# Patient Record
Sex: FEMALE | Race: WHITE | NOT HISPANIC OR LATINO
[De-identification: names, ages, dates, MRNs, and addresses within clinical notes are randomized per-mention and may not be internally consistent; named-entity substitution may affect disease eponyms.]

---

## 2019-03-04 ENCOUNTER — APPOINTMENT (OUTPATIENT)
Dept: SURGERY | Facility: CLINIC | Age: 64
End: 2019-03-04
Payer: COMMERCIAL

## 2019-03-04 VITALS
HEART RATE: 84 BPM | DIASTOLIC BLOOD PRESSURE: 77 MMHG | WEIGHT: 233.4 LBS | RESPIRATION RATE: 18 BRPM | SYSTOLIC BLOOD PRESSURE: 138 MMHG | TEMPERATURE: 99.1 F | BODY MASS INDEX: 41.36 KG/M2 | OXYGEN SATURATION: 97 % | HEIGHT: 63 IN

## 2019-03-04 DIAGNOSIS — Z98.84 BARIATRIC SURGERY STATUS: ICD-10-CM

## 2019-03-04 DIAGNOSIS — Z82.49 FAMILY HISTORY OF ISCHEMIC HEART DISEASE AND OTHER DISEASES OF THE CIRCULATORY SYSTEM: ICD-10-CM

## 2019-03-04 DIAGNOSIS — Z80.9 FAMILY HISTORY OF MALIGNANT NEOPLASM, UNSPECIFIED: ICD-10-CM

## 2019-03-04 DIAGNOSIS — Z87.891 PERSONAL HISTORY OF NICOTINE DEPENDENCE: ICD-10-CM

## 2019-03-04 LAB — HBA1C MFR BLD HPLC: 5.7 %

## 2019-03-04 PROCEDURE — 99203 OFFICE O/P NEW LOW 30 MIN: CPT

## 2019-03-04 RX ORDER — ATORVASTATIN CALCIUM 20 MG/1
20 TABLET, FILM COATED ORAL
Refills: 0 | Status: ACTIVE | COMMUNITY

## 2019-03-05 LAB
25(OH)D3 SERPL-MCNC: 17.3 NG/ML
ALBUMIN SERPL ELPH-MCNC: 4.4 G/DL
ALP BLD-CCNC: 99 U/L
ALT SERPL-CCNC: 14 U/L
ANION GAP SERPL CALC-SCNC: 13 MMOL/L
AST SERPL-CCNC: 16 U/L
BASOPHILS # BLD AUTO: 0.1 K/UL
BASOPHILS NFR BLD AUTO: 1 %
BILIRUB SERPL-MCNC: 0.8 MG/DL
BUN SERPL-MCNC: 21 MG/DL
CALCIUM SERPL-MCNC: 10.1 MG/DL
CHLORIDE SERPL-SCNC: 105 MMOL/L
CO2 SERPL-SCNC: 24 MMOL/L
CREAT SERPL-MCNC: 0.82 MG/DL
EOSINOPHIL # BLD AUTO: 0.09 K/UL
EOSINOPHIL NFR BLD AUTO: 0.9 %
FOLATE SERPL-MCNC: 6.4 NG/ML
GLUCOSE SERPL-MCNC: 101 MG/DL
HCT VFR BLD CALC: 42.1 %
HGB BLD-MCNC: 13.2 G/DL
IMM GRANULOCYTES NFR BLD AUTO: 0.2 %
IRON SERPL-MCNC: 67 UG/DL
LYMPHOCYTES # BLD AUTO: 2.41 K/UL
LYMPHOCYTES NFR BLD AUTO: 24.1 %
MAN DIFF?: NORMAL
MCHC RBC-ENTMCNC: 28.3 PG
MCHC RBC-ENTMCNC: 31.4 GM/DL
MCV RBC AUTO: 90.3 FL
MONOCYTES # BLD AUTO: 0.72 K/UL
MONOCYTES NFR BLD AUTO: 7.2 %
NEUTROPHILS # BLD AUTO: 6.68 K/UL
NEUTROPHILS NFR BLD AUTO: 66.6 %
PLATELET # BLD AUTO: 464 K/UL
POTASSIUM SERPL-SCNC: 4.6 MMOL/L
PROT SERPL-MCNC: 7.3 G/DL
RBC # BLD: 4.66 M/UL
RBC # FLD: 14 %
SODIUM SERPL-SCNC: 142 MMOL/L
VIT B12 SERPL-MCNC: 377 PG/ML
WBC # FLD AUTO: 10.02 K/UL

## 2019-03-10 ENCOUNTER — FORM ENCOUNTER (OUTPATIENT)
Age: 64
End: 2019-03-10

## 2019-03-11 ENCOUNTER — OUTPATIENT (OUTPATIENT)
Dept: OUTPATIENT SERVICES | Facility: HOSPITAL | Age: 64
LOS: 1 days | End: 2019-03-11
Payer: MEDICAID

## 2019-03-11 ENCOUNTER — APPOINTMENT (OUTPATIENT)
Dept: RADIOLOGY | Facility: HOSPITAL | Age: 64
End: 2019-03-11
Payer: COMMERCIAL

## 2019-03-11 DIAGNOSIS — Z00.00 ENCOUNTER FOR GENERAL ADULT MEDICAL EXAMINATION WITHOUT ABNORMAL FINDINGS: ICD-10-CM

## 2019-03-11 DIAGNOSIS — Z46.51 ENCOUNTER FOR FITTING AND ADJUSTMENT OF GASTRIC LAP BAND: ICD-10-CM

## 2019-03-11 LAB — VIT B1 SERPL-MCNC: 125 NMOL/L

## 2019-03-11 PROCEDURE — 43999 UNLISTED PROCEDURE STOMACH: CPT

## 2019-03-11 PROCEDURE — 77002 NEEDLE LOCALIZATION BY XRAY: CPT

## 2019-04-30 ENCOUNTER — APPOINTMENT (OUTPATIENT)
Dept: SURGERY | Facility: CLINIC | Age: 64
End: 2019-04-30
Payer: COMMERCIAL

## 2019-04-30 VITALS
SYSTOLIC BLOOD PRESSURE: 107 MMHG | DIASTOLIC BLOOD PRESSURE: 70 MMHG | OXYGEN SATURATION: 98 % | RESPIRATION RATE: 16 BRPM | HEART RATE: 89 BPM | TEMPERATURE: 98.8 F

## 2019-04-30 PROCEDURE — 99214 OFFICE O/P EST MOD 30 MIN: CPT

## 2019-09-03 ENCOUNTER — RESULT REVIEW (OUTPATIENT)
Age: 64
End: 2019-09-03

## 2019-09-05 ENCOUNTER — APPOINTMENT (OUTPATIENT)
Dept: PLASTIC SURGERY | Facility: CLINIC | Age: 64
End: 2019-09-05
Payer: COMMERCIAL

## 2019-09-05 VITALS — BODY MASS INDEX: 40.75 KG/M2 | HEIGHT: 63 IN | WEIGHT: 230 LBS

## 2019-09-05 PROCEDURE — 99204 OFFICE O/P NEW MOD 45 MIN: CPT

## 2019-09-09 NOTE — ASSESSMENT
[FreeTextEntry1] :  63 year old female presents for initial consultation at the request of Dr. Lei regarding excess skin of abdomen and thighs. Pt is s/p lap band surgery 9 years ago. Plan to proceed with laparoscopic sleeve gastrectomy 10/2019. \par \par Exam findings discussed with the patient today. We discussed possible panniculectomy abdominoplasty as well as bilateral thigh lift in conjunction with liposuction for symptomatic relief. We discussed the planned procedure including alternatives, risks and potential complications which include but are not limited to infection, bleeding, recurrence, seroma, asymmetry, deformity, scarring, paresthesia, wound dehiscence. We also discussed post-operative recovery period as well as restrictions. We discussed what to expect in terms of scarring as well. All patient questions were answered. It was recommended to proceed with planned weight loss as well as achieving stable and goal weight prior to proceed with either surgical interventions.\par \par Follow up in 6 months.

## 2019-09-09 NOTE — ADDENDUM
[FreeTextEntry1] : All medical record entries made were at my, OSCAR JOINER MD, direction and personally dictated by me. I have reviewed the chart and agree that the record accurately reflects my personal performance of the history, physical exam, assessment, and plan.

## 2019-09-09 NOTE — CONSULT LETTER
[Dear  ___] : Dear  [unfilled], [Please see my note below.] : Please see my note below. [Consult Letter:] : I had the pleasure of evaluating your patient, [unfilled]. [Consult Closing:] : Thank you very much for allowing me to participate in the care of this patient.  If you have any questions, please do not hesitate to contact me. [Sincerely,] : Sincerely, [FreeTextEntry3] : Dennys Garcia MD

## 2019-09-09 NOTE — PHYSICAL EXAM
[de-identified] : The patient is ambulatory, well groomed, no signs of cachexia. [de-identified] : Normocephalic, atraumatic. [de-identified] : There are no masses, scars, or lesions of the external ear. External nose is midline with no scars or masses. Gross hearing intact bilaterally (finger rub). Nasal mucosa has no edema, lesions, or signs of desiccation. Lips and gums have no masses, lesions, friability, or edema. [de-identified] : No conjunctival erythema, hyperemia, or discharge bilaterally; no ectropion, entropion, lagophthalmos, or lid malposition bilaterally. Pupils are equal, round, and reactive to light bilaterally. [de-identified] : No sign of respiratory distress, no tachypnea, no use of accessory respiratory muscles. [de-identified] : Neck is soft without edema, masses, or crepitus. Trachea midline. No thyromegaly; no palpable thyroid nodules. [de-identified] : No swelling, tenderness, or varicosities of the extremities bilaterally; extremities are warm to palpation and pedal pulses intact. [de-identified] : No hepatomegaly or splenomegaly. No abdominal wall tenderness or masses on palpation. No abdominal wall hernias noted.\par heavy hanging infraumbilical pannus, no intertrigo noted today, no erythema\par  [de-identified] : On examination, patient has normal gait and station.\par  [de-identified] : The patient is alert and oriented to time, place, and person. No obvious disorder of mood or affect such as anxiety or agitation. [de-identified] : heavy hanging excess skin noted on the upper inner and outer thighs\par

## 2019-09-09 NOTE — HISTORY OF PRESENT ILLNESS
[FreeTextEntry1] : This is a pleasant 63 year old female presents for initial consultation at the request of Dr. Lei regarding excess skin of abdomen and thighs. Pt is s/p lap band surgery 9 years ago. Prior to lap band placement patient weighed 299 lbs, following surgery her weight was 180 lbs. She currently weighs 230 lbs, currently undergoing dieting and exercise. She states that she plans on proceeding with laparoscopic sleeve gastrectomy this coming October with Dr. Lei. However, she would like to discuss her options following her weight loss in terms of the excess heavy skin. Pt states that due to her drastic weight loss, she has heavy loose hanging skin. Often gets rashes in between the skin folds that she treats with creams. Here for evaluation. \par Non-smoker. No anticoagulant use. Otherwise no other complaints or concerns; denies fever, sweats, or chills.

## 2019-11-18 DIAGNOSIS — E53.8 DEFICIENCY OF OTHER SPECIFIED B GROUP VITAMINS: ICD-10-CM

## 2019-12-04 DIAGNOSIS — N20.0 CALCULUS OF KIDNEY: ICD-10-CM

## 2020-02-03 ENCOUNTER — APPOINTMENT (OUTPATIENT)
Dept: SURGERY | Facility: CLINIC | Age: 65
End: 2020-02-03
Payer: COMMERCIAL

## 2020-02-03 PROCEDURE — 99215 OFFICE O/P EST HI 40 MIN: CPT

## 2020-02-11 ENCOUNTER — OUTPATIENT (OUTPATIENT)
Dept: OUTPATIENT SERVICES | Facility: HOSPITAL | Age: 65
LOS: 1 days | End: 2020-02-11
Payer: COMMERCIAL

## 2020-02-11 VITALS
HEIGHT: 63 IN | HEART RATE: 75 BPM | DIASTOLIC BLOOD PRESSURE: 78 MMHG | SYSTOLIC BLOOD PRESSURE: 129 MMHG | RESPIRATION RATE: 20 BRPM | WEIGHT: 228.62 LBS | TEMPERATURE: 99 F

## 2020-02-11 DIAGNOSIS — Z98.890 OTHER SPECIFIED POSTPROCEDURAL STATES: Chronic | ICD-10-CM

## 2020-02-11 DIAGNOSIS — Z98.891 HISTORY OF UTERINE SCAR FROM PREVIOUS SURGERY: Chronic | ICD-10-CM

## 2020-02-11 DIAGNOSIS — Z13.89 ENCOUNTER FOR SCREENING FOR OTHER DISORDER: ICD-10-CM

## 2020-02-11 DIAGNOSIS — Z90.89 ACQUIRED ABSENCE OF OTHER ORGANS: Chronic | ICD-10-CM

## 2020-02-11 DIAGNOSIS — Z98.84 BARIATRIC SURGERY STATUS: Chronic | ICD-10-CM

## 2020-02-11 DIAGNOSIS — Z29.9 ENCOUNTER FOR PROPHYLACTIC MEASURES, UNSPECIFIED: ICD-10-CM

## 2020-02-11 DIAGNOSIS — Z01.818 ENCOUNTER FOR OTHER PREPROCEDURAL EXAMINATION: ICD-10-CM

## 2020-02-11 DIAGNOSIS — E66.01 MORBID (SEVERE) OBESITY DUE TO EXCESS CALORIES: ICD-10-CM

## 2020-02-11 DIAGNOSIS — Z90.49 ACQUIRED ABSENCE OF OTHER SPECIFIED PARTS OF DIGESTIVE TRACT: Chronic | ICD-10-CM

## 2020-02-11 DIAGNOSIS — K43.2 INCISIONAL HERNIA WITHOUT OBSTRUCTION OR GANGRENE: Chronic | ICD-10-CM

## 2020-02-11 LAB
ALBUMIN SERPL ELPH-MCNC: 4.2 G/DL — SIGNIFICANT CHANGE UP (ref 3.3–5.2)
ALP SERPL-CCNC: 106 U/L — SIGNIFICANT CHANGE UP (ref 40–120)
ALT FLD-CCNC: 24 U/L — SIGNIFICANT CHANGE UP
ANION GAP SERPL CALC-SCNC: 14 MMOL/L — SIGNIFICANT CHANGE UP (ref 5–17)
AST SERPL-CCNC: 28 U/L — SIGNIFICANT CHANGE UP
BASOPHILS # BLD AUTO: 0.09 K/UL — SIGNIFICANT CHANGE UP (ref 0–0.2)
BASOPHILS NFR BLD AUTO: 0.9 % — SIGNIFICANT CHANGE UP (ref 0–2)
BILIRUB SERPL-MCNC: 0.7 MG/DL — SIGNIFICANT CHANGE UP (ref 0.4–2)
BLD GP AB SCN SERPL QL: SIGNIFICANT CHANGE UP
BUN SERPL-MCNC: 17 MG/DL — SIGNIFICANT CHANGE UP (ref 8–20)
CALCIUM SERPL-MCNC: 9.7 MG/DL — SIGNIFICANT CHANGE UP (ref 8.6–10.2)
CHLORIDE SERPL-SCNC: 97 MMOL/L — LOW (ref 98–107)
CO2 SERPL-SCNC: 25 MMOL/L — SIGNIFICANT CHANGE UP (ref 22–29)
CREAT SERPL-MCNC: 0.81 MG/DL — SIGNIFICANT CHANGE UP (ref 0.5–1.3)
EOSINOPHIL # BLD AUTO: 0.11 K/UL — SIGNIFICANT CHANGE UP (ref 0–0.5)
EOSINOPHIL NFR BLD AUTO: 1.1 % — SIGNIFICANT CHANGE UP (ref 0–6)
GLUCOSE SERPL-MCNC: 105 MG/DL — HIGH (ref 70–99)
HBA1C BLD-MCNC: 5.5 % — SIGNIFICANT CHANGE UP (ref 4–5.6)
HCT VFR BLD CALC: 43.5 % — SIGNIFICANT CHANGE UP (ref 34.5–45)
HGB BLD-MCNC: 13.6 G/DL — SIGNIFICANT CHANGE UP (ref 11.5–15.5)
IMM GRANULOCYTES NFR BLD AUTO: 0.2 % — SIGNIFICANT CHANGE UP (ref 0–1.5)
LYMPHOCYTES # BLD AUTO: 2.37 K/UL — SIGNIFICANT CHANGE UP (ref 1–3.3)
LYMPHOCYTES # BLD AUTO: 23.5 % — SIGNIFICANT CHANGE UP (ref 13–44)
MAGNESIUM SERPL-MCNC: 2.2 MG/DL — SIGNIFICANT CHANGE UP (ref 1.6–2.6)
MCHC RBC-ENTMCNC: 28 PG — SIGNIFICANT CHANGE UP (ref 27–34)
MCHC RBC-ENTMCNC: 31.3 GM/DL — LOW (ref 32–36)
MCV RBC AUTO: 89.7 FL — SIGNIFICANT CHANGE UP (ref 80–100)
MONOCYTES # BLD AUTO: 0.66 K/UL — SIGNIFICANT CHANGE UP (ref 0–0.9)
MONOCYTES NFR BLD AUTO: 6.5 % — SIGNIFICANT CHANGE UP (ref 2–14)
NEUTROPHILS # BLD AUTO: 6.83 K/UL — SIGNIFICANT CHANGE UP (ref 1.8–7.4)
NEUTROPHILS NFR BLD AUTO: 67.8 % — SIGNIFICANT CHANGE UP (ref 43–77)
PHOSPHATE SERPL-MCNC: 3.3 MG/DL — SIGNIFICANT CHANGE UP (ref 2.4–4.7)
PLATELET # BLD AUTO: 449 K/UL — HIGH (ref 150–400)
POTASSIUM SERPL-MCNC: 4.4 MMOL/L — SIGNIFICANT CHANGE UP (ref 3.5–5.3)
POTASSIUM SERPL-SCNC: 4.4 MMOL/L — SIGNIFICANT CHANGE UP (ref 3.5–5.3)
PROT SERPL-MCNC: 7.6 G/DL — SIGNIFICANT CHANGE UP (ref 6.6–8.7)
RBC # BLD: 4.85 M/UL — SIGNIFICANT CHANGE UP (ref 3.8–5.2)
RBC # FLD: 13.3 % — SIGNIFICANT CHANGE UP (ref 10.3–14.5)
SODIUM SERPL-SCNC: 136 MMOL/L — SIGNIFICANT CHANGE UP (ref 135–145)
WBC # BLD: 10.08 K/UL — SIGNIFICANT CHANGE UP (ref 3.8–10.5)
WBC # FLD AUTO: 10.08 K/UL — SIGNIFICANT CHANGE UP (ref 3.8–10.5)

## 2020-02-11 PROCEDURE — G0463: CPT

## 2020-02-11 PROCEDURE — 93005 ELECTROCARDIOGRAM TRACING: CPT

## 2020-02-11 PROCEDURE — 93010 ELECTROCARDIOGRAM REPORT: CPT

## 2020-02-11 RX ORDER — CEFAZOLIN SODIUM 1 G
2000 VIAL (EA) INJECTION ONCE
Refills: 0 | Status: DISCONTINUED | OUTPATIENT
Start: 2020-02-18 | End: 2020-02-19

## 2020-02-11 NOTE — H&P PST ADULT - NSANTHOSAYNRD_GEN_A_CORE
No. YUSEF screening performed.  STOP BANG Legend: 0-2 = LOW Risk; 3-4 = INTERMEDIATE Risk; 5-8 = HIGH Risk

## 2020-02-11 NOTE — H&P PST ADULT - NSICDXFAMILYHX_GEN_ALL_CORE_FT
FAMILY HISTORY:  Father  Still living? No  FH: esophageal cancer, Age at diagnosis: Age Unknown    Sibling  Still living? No  FH: brain aneurysm, Age at diagnosis: 51-60  FH: colon cancer, Age at diagnosis: 41-50

## 2020-02-11 NOTE — H&P PST ADULT - NSICDXPASTSURGICALHX_GEN_ALL_CORE_FT
PAST SURGICAL HISTORY:  H/O laparoscopic adjustable gastric banding     H/O left breast biopsy     Incisional hernia     S/P      S/P cholecystectomy     S/P hernia repair x 4    S/P LASIK surgery of both eyes     S/P left knee arthroscopy     S/P tonsillectomy

## 2020-02-11 NOTE — PATIENT PROFILE ADULT - NSPROEDALEARNPREF_GEN_A_NUR
Pre op teaching surgical scrub pain management instructions given to pt by NP/individual instruction/written material/verbal instruction

## 2020-02-11 NOTE — H&P PST ADULT - HISTORY OF PRESENT ILLNESS
This is a 64 y.o female who presents to PST today. This is a 64 y.o female who presents to PST today.  The pt reports she had a gastric band inserted approximately ten years ago.  She is now having that removed and a vertical sleeve inserted.  She is scheduled for a Robotic Assisted, Laparoscopic Possible Open, Removal of Gastric Band and Conversion to Vertical Sleeve Gastrectomy in the near future.

## 2020-02-11 NOTE — H&P PST ADULT - ASSESSMENT
CAPRINI VTE 2.0 SCORE [CLOT updated 2019]    AGE RELATED RISK FACTORS                                                       MOBILITY RELATED FACTORS  [ ] Age 41-60 years                                            (1 Point)                    [ ] Bed rest                                                        (1 Point)  [ ] Age: 61-74 years                                           (2 Points)                  [ ] Plaster cast                                                   (2 Points)  [ ] Age= 75 years                                              (3 Points)                    [ ] Bed bound for more than 72 hours                 (2 Points)    DISEASE RELATED RISK FACTORS                                               GENDER SPECIFIC FACTORS  [ ] Edema in the lower extremities                       (1 Point)              [ ] Pregnancy                                                     (1 Point)  [ ] Varicose veins                                               (1 Point)                     [ ] Post-partum < 6 weeks                                   (1 Point)             [ ] BMI > 25 Kg/m2                                            (1 Point)                     [ ] Hormonal therapy  or oral contraception          (1 Point)                 [ ] Sepsis (in the previous month)                        (1 Point)               [ ] History of pregnancy complications                 (1 point)  [ ] Pneumonia or serious lung disease                                               [ ] Unexplained or recurrent                     (1 Point)           (in the previous month)                               (1 Point)  [ ] Abnormal pulmonary function test                     (1 Point)                 SURGERY RELATED RISK FACTORS  [ ] Acute myocardial infarction                              (1 Point)               [ ]  Section                                             (1 Point)  [ ] Congestive heart failure (in the previous month)  (1 Point)      [ ] Minor surgery                                                  (1 Point)   [ ] Inflammatory bowel disease                             (1 Point)               [ ] Arthroscopic surgery                                        (2 Points)  [ ] Central venous access                                      (2 Points)                [ ] General surgery lasting more than 45 minutes (2 points)  [ ] Malignancy- Present or previous                   (2 Points)                [ ] Elective arthroplasty                                         (5 points)    [ ] Stroke (in the previous month)                          (5 Points)                                                                                                                                                           HEMATOLOGY RELATED FACTORS                                                 TRAUMA RELATED RISK FACTORS  [ ] Prior episodes of VTE                                     (3 Points)                [ ] Fracture of the hip, pelvis, or leg                       (5 Points)  [ ] Positive family history for VTE                         (3 Points)             [ ] Acute spinal cord injury (in the previous month)  (5 Points)  [ ] Prothrombin 89488 A                                     (3 Points)               [ ] Paralysis  (less than 1 month)                             (5 Points)  [ ] Factor V Leiden                                             (3 Points)                  [ ] Multiple Trauma within 1 month                        (5 Points)  [ ] Lupus anticoagulants                                     (3 Points)                                                           [ ] Anticardiolipin antibodies                               (3 Points)                                                       [ ] High homocysteine in the blood                      (3 Points)                                             [ ] Other congenital or acquired thrombophilia      (3 Points)                                                [ ] Heparin induced thrombocytopenia                  (3 Points)                                     Total Score [          ]    OPIOID RISK TOOL    [place] EACH BOX THAT APPLIES AND ADD TOTALS AT THE END    FAMILY HISTORY OF SUBSTANCE ABUSE                 FEMALE         MALE                                                Alcohol                            [    ] 1 pt         [     ] 3pts                                               Illegal Drugs                    [    ] 2 pts       [     ] 3pts                                               Rx Drugs                          [      ]4 pts      [     ] 4 pts    PERSONAL HISTORY OF SUBSTANCE ABUSE                                                                                          Alcohol                            [     ] 3 pts       [     ] 3 pts                                               Illegal Drugs                    [     ] 4 pts       [     ] 4 pts                                               Rx Drugs                          [     ] 5 pts       [     ] 5 pts    AGE BETWEEN 16-45 YEARS                                     [     ] 1 pt         [     ] 1 pt    HISTORY OF PREADOLESCENT   SEXUAL ABUSE                                                            [     ] 3 pts        [     ] 0pts    PSYCHOLOGICAL DISEASE                     ADD, OCD, Bipolar, Schizophrenia        [     ] 2 pts        [     ] 2 pts                      Depression                                               [     ] 1 pt          [     ] 1 pt           SCORING TOTAL   (add numbers and type here)              (      )                                     A score of 3 or lower indicated LOW risk for future opioid abuse  A score of 4 to 7 indicated moderate risk for future opioid abuse  A score of 8 or higher indicates a high risk for opioid abuse CAPRINI VTE 2.0 SCORE [CLOT updated 2019]    AGE RELATED RISK FACTORS                                                       MOBILITY RELATED FACTORS  [ ] Age 41-60 years                                            (1 Point)                    [ ] Bed rest                                                        (1 Point)  [x ] Age: 61-74 years                                           (2 Points)                  [ ] Plaster cast                                                   (2 Points)  [ ] Age= 75 years                                              (3 Points)                    [ ] Bed bound for more than 72 hours                 (2 Points)    DISEASE RELATED RISK FACTORS                                               GENDER SPECIFIC FACTORS  [ ] Edema in the lower extremities                       (1 Point)              [ ] Pregnancy                                                     (1 Point)  [ ] Varicose veins                                               (1 Point)                     [ ] Post-partum < 6 weeks                                   (1 Point)             [ x] BMI > 25 Kg/m2                                            (1 Point)                     [ ] Hormonal therapy  or oral contraception          (1 Point)                 [ ] Sepsis (in the previous month)                        (1 Point)               [ ] History of pregnancy complications                 (1 point)  [ ] Pneumonia or serious lung disease                                               [ ] Unexplained or recurrent                     (1 Point)           (in the previous month)                               (1 Point)  [ ] Abnormal pulmonary function test                     (1 Point)                 SURGERY RELATED RISK FACTORS  [ ] Acute myocardial infarction                              (1 Point)               [ ]  Section                                             (1 Point)  [ ] Congestive heart failure (in the previous month)  (1 Point)      [ ] Minor surgery                                                  (1 Point)   [ ] Inflammatory bowel disease                             (1 Point)               [ ] Arthroscopic surgery                                        (2 Points)  [ ] Central venous access                                      (2 Points)                [ x] General surgery lasting more than 45 minutes (2 points)  [ ] Malignancy- Present or previous                   (2 Points)                [ ] Elective arthroplasty                                         (5 points)    [ ] Stroke (in the previous month)                          (5 Points)                                                                                                                                                           HEMATOLOGY RELATED FACTORS                                                 TRAUMA RELATED RISK FACTORS  [ ] Prior episodes of VTE                                     (3 Points)                [ ] Fracture of the hip, pelvis, or leg                       (5 Points)  [ ] Positive family history for VTE                         (3 Points)             [ ] Acute spinal cord injury (in the previous month)  (5 Points)  [ ] Prothrombin 65899 A                                     (3 Points)               [ ] Paralysis  (less than 1 month)                             (5 Points)  [ ] Factor V Leiden                                             (3 Points)                  [ ] Multiple Trauma within 1 month                        (5 Points)  [ ] Lupus anticoagulants                                     (3 Points)                                                           [ ] Anticardiolipin antibodies                               (3 Points)                                                       [ ] High homocysteine in the blood                      (3 Points)                                             [ ] Other congenital or acquired thrombophilia      (3 Points)                                                [ ] Heparin induced thrombocytopenia                  (3 Points)                                     Total Score [   5       ]    OPIOID RISK TOOL    [place] EACH BOX THAT APPLIES AND ADD TOTALS AT THE END    FAMILY HISTORY OF SUBSTANCE ABUSE                 FEMALE         MALE                                                Alcohol                            [    ] 1 pt         [     ] 3pts                                               Illegal Drugs                    [    ] 2 pts       [     ] 3pts                                               Rx Drugs                          [      ]4 pts      [     ] 4 pts    PERSONAL HISTORY OF SUBSTANCE ABUSE                                                                                          Alcohol                            [     ] 3 pts       [     ] 3 pts                                               Illegal Drugs                    [     ] 4 pts       [     ] 4 pts                                               Rx Drugs                          [     ] 5 pts       [     ] 5 pts    AGE BETWEEN 16-45 YEARS                                     [     ] 1 pt         [     ] 1 pt    HISTORY OF PREADOLESCENT   SEXUAL ABUSE                                                            [     ] 3 pts        [     ] 0pts    PSYCHOLOGICAL DISEASE                     ADD, OCD, Bipolar, Schizophrenia        [     ] 2 pts        [     ] 2 pts                      Depression                                               [     ] 1 pt          [     ] 1 pt           SCORING TOTAL   (add numbers and type here)              (  0    )                                     A score of 3 or lower indicated LOW risk for future opioid abuse  A score of 4 to 7 indicated moderate risk for future opioid abuse  A score of 8 or higher indicates a high risk for opioid abuse

## 2020-02-11 NOTE — H&P PST ADULT - LAB RESULTS AND INTERPRETATION
Incentive Spirometry reviewed with patient.  Clinical pharmacist called; left message on phone. (FERMIN TRUJILLO)

## 2020-02-11 NOTE — H&P PST ADULT - NSALCOHOLFREQ_GEN_A_CORE_SD
We want to give the best care possible. If you receive a Press Ganey survey from our office, please take the time to fill out the survey and return it in the envelope provided. Your feedback helps us know how we are doing and we really appreciate it.Thank you.     monthly or less

## 2020-02-11 NOTE — PATIENT PROFILE ADULT - ANY IMPAIRED UPPER EXTREMITY FUNCTION WITHIN A WEEK PRIOR TO ADMISSION RELATED TO?
DISCUSSED PATIENT CONCERNS WITH . PATIENT CONTINUES TO BE VERY
RESTLESS AND UNABLE TO VOID. BLADDER SCAN CONTINUES TO READ >200 MLS.
DIFFICULT TO ASSESS DUE TO CONSTANT MOVING BY THE PATIENT. APPEARS DISTENDED.
PATIENT COMPLAINS OF PAIN IN HIS LEGS. ORDERS TO STRAIGHT CATH IF BLADDER SCAN
>200 MLS. PRN ATIVAN FOR AGITATION. VERIFIED VIA REPEAT BACK. no

## 2020-02-11 NOTE — H&P PST ADULT - NSICDXPROBLEM_GEN_ALL_CORE_FT
PROBLEM DIAGNOSES  Problem: Morbid (severe) obesity due to excess calories  Assessment and Plan: Robotic Assisted Laparoscopic, Possible Open, Removal of Gastric Band and Conversion to Vertical Sleeve Gastrectomy  Medical Clearance  Additional clearances per surgeon's office    Problem: Need for prophylactic measure  Assessment and Plan:     Problem: Screening for substance abuse  Assessment and Plan: PROBLEM DIAGNOSES  Problem: Morbid (severe) obesity due to excess calories  Assessment and Plan: Robotic Assisted Laparoscopic, Possible Open, Removal of Gastric Band and Conversion to Vertical Sleeve Gastrectomy  Medical Clearance  Additional clearances per surgeon's office    Problem: Need for prophylactic measure  Assessment and Plan: Moderate risk.  Surgical team to evaluate need for pharmacologic VTE prophylaxis    Problem: Screening for substance abuse  Assessment and Plan: Opioid screening tool score =0.  Low risk for potential abuse

## 2020-02-12 NOTE — PHARMACOTHERAPY INTERVENTION NOTE - COMMENTS
Vertical sleeve gastrectomy scheduled for 2/18/2020.  Patient medication reconciliation completed. Patient currently taking:     - Atorvastatin 20mg tablet - 1 tablet daily    - Pantoprazole 40mg DR tablet - 1 tablet daily    Patient was instructed to use crushed, dissolvable, chewable, or liquid formulations of medications for 1 month. Patient was informed to take daily multivitamins post surgically. Patient reeducated on NSAID avoidance (ibuprofen, ASA, naproxen, aleve) as they increased risk of GI bleeding; may use APAP for mild pain otherwise contact prescriber for consult. Patient was informed on indications and directions for administration for hyoscyamine SL, acetaminophen liquid, ondansetron ODT, and omeprazole DR. Patient was instructed to take the medications as follows:     - crush atorvastatin

## 2020-02-17 ENCOUNTER — TRANSCRIPTION ENCOUNTER (OUTPATIENT)
Age: 65
End: 2020-02-17

## 2020-02-18 ENCOUNTER — INPATIENT (INPATIENT)
Facility: HOSPITAL | Age: 65
LOS: 0 days | Discharge: HOME | DRG: 327 | End: 2020-02-19
Attending: SURGERY | Admitting: SURGERY
Payer: COMMERCIAL

## 2020-02-18 ENCOUNTER — TRANSCRIPTION ENCOUNTER (OUTPATIENT)
Age: 65
End: 2020-02-18

## 2020-02-18 ENCOUNTER — RESULT REVIEW (OUTPATIENT)
Age: 65
End: 2020-02-18

## 2020-02-18 ENCOUNTER — APPOINTMENT (OUTPATIENT)
Dept: SURGERY | Facility: HOSPITAL | Age: 65
End: 2020-02-18
Payer: COMMERCIAL

## 2020-02-18 VITALS
TEMPERATURE: 97 F | SYSTOLIC BLOOD PRESSURE: 119 MMHG | RESPIRATION RATE: 16 BRPM | WEIGHT: 227.08 LBS | HEIGHT: 63 IN | DIASTOLIC BLOOD PRESSURE: 57 MMHG | HEART RATE: 78 BPM | OXYGEN SATURATION: 97 %

## 2020-02-18 DIAGNOSIS — Z98.890 OTHER SPECIFIED POSTPROCEDURAL STATES: Chronic | ICD-10-CM

## 2020-02-18 DIAGNOSIS — Z98.891 HISTORY OF UTERINE SCAR FROM PREVIOUS SURGERY: Chronic | ICD-10-CM

## 2020-02-18 DIAGNOSIS — E66.01 MORBID (SEVERE) OBESITY DUE TO EXCESS CALORIES: ICD-10-CM

## 2020-02-18 DIAGNOSIS — K43.2 INCISIONAL HERNIA WITHOUT OBSTRUCTION OR GANGRENE: Chronic | ICD-10-CM

## 2020-02-18 DIAGNOSIS — Z90.49 ACQUIRED ABSENCE OF OTHER SPECIFIED PARTS OF DIGESTIVE TRACT: Chronic | ICD-10-CM

## 2020-02-18 DIAGNOSIS — Z98.84 BARIATRIC SURGERY STATUS: Chronic | ICD-10-CM

## 2020-02-18 DIAGNOSIS — Z90.89 ACQUIRED ABSENCE OF OTHER ORGANS: Chronic | ICD-10-CM

## 2020-02-18 LAB
ABO RH CONFIRMATION: SIGNIFICANT CHANGE UP
ANISOCYTOSIS BLD QL: SLIGHT — SIGNIFICANT CHANGE UP
BASOPHILS # BLD AUTO: 0 K/UL — SIGNIFICANT CHANGE UP (ref 0–0.2)
BASOPHILS NFR BLD AUTO: 0 % — SIGNIFICANT CHANGE UP (ref 0–2)
EOSINOPHIL # BLD AUTO: 0 K/UL — SIGNIFICANT CHANGE UP (ref 0–0.5)
EOSINOPHIL NFR BLD AUTO: 0 % — SIGNIFICANT CHANGE UP (ref 0–6)
GIANT PLATELETS BLD QL SMEAR: PRESENT — SIGNIFICANT CHANGE UP
GLUCOSE BLDC GLUCOMTR-MCNC: 98 MG/DL — SIGNIFICANT CHANGE UP (ref 70–99)
HCT VFR BLD CALC: 39.8 % — SIGNIFICANT CHANGE UP (ref 34.5–45)
HGB BLD-MCNC: 12.5 G/DL — SIGNIFICANT CHANGE UP (ref 11.5–15.5)
LYMPHOCYTES # BLD AUTO: 1.18 K/UL — SIGNIFICANT CHANGE UP (ref 1–3.3)
LYMPHOCYTES # BLD AUTO: 4.3 % — LOW (ref 13–44)
MACROCYTES BLD QL: SLIGHT — SIGNIFICANT CHANGE UP
MANUAL SMEAR VERIFICATION: SIGNIFICANT CHANGE UP
MCHC RBC-ENTMCNC: 28.9 PG — SIGNIFICANT CHANGE UP (ref 27–34)
MCHC RBC-ENTMCNC: 31.4 GM/DL — LOW (ref 32–36)
MCV RBC AUTO: 91.9 FL — SIGNIFICANT CHANGE UP (ref 80–100)
MICROCYTES BLD QL: SLIGHT — SIGNIFICANT CHANGE UP
MONOCYTES # BLD AUTO: 0.25 K/UL — SIGNIFICANT CHANGE UP (ref 0–0.9)
MONOCYTES NFR BLD AUTO: 0.9 % — LOW (ref 2–14)
NEUTROPHILS # BLD AUTO: 26.12 K/UL — HIGH (ref 1.8–7.4)
NEUTROPHILS NFR BLD AUTO: 93.9 % — HIGH (ref 43–77)
NEUTS BAND # BLD: 0.9 % — SIGNIFICANT CHANGE UP (ref 0–8)
PLAT MORPH BLD: NORMAL — SIGNIFICANT CHANGE UP
PLATELET # BLD AUTO: 445 K/UL — HIGH (ref 150–400)
POLYCHROMASIA BLD QL SMEAR: SLIGHT — SIGNIFICANT CHANGE UP
RBC # BLD: 4.33 M/UL — SIGNIFICANT CHANGE UP (ref 3.8–5.2)
RBC # FLD: 13.2 % — SIGNIFICANT CHANGE UP (ref 10.3–14.5)
RBC BLD AUTO: SIGNIFICANT CHANGE UP
WBC # BLD: 27.55 K/UL — HIGH (ref 3.8–10.5)
WBC # FLD AUTO: 27.55 K/UL — HIGH (ref 3.8–10.5)

## 2020-02-18 PROCEDURE — 43775 LAP SLEEVE GASTRECTOMY: CPT

## 2020-02-18 PROCEDURE — 43774 LAP RMVL GASTR ADJ ALL PARTS: CPT

## 2020-02-18 PROCEDURE — 43775 LAP SLEEVE GASTRECTOMY: CPT | Mod: AS

## 2020-02-18 PROCEDURE — 43200 ESOPHAGOSCOPY FLEXIBLE BRUSH: CPT

## 2020-02-18 PROCEDURE — 43774 LAP RMVL GASTR ADJ ALL PARTS: CPT | Mod: 80

## 2020-02-18 PROCEDURE — 43775 LAP SLEEVE GASTRECTOMY: CPT | Mod: 80

## 2020-02-18 PROCEDURE — S2900 ROBOTIC SURGICAL SYSTEM: CPT | Mod: NC

## 2020-02-18 PROCEDURE — 88305 TISSUE EXAM BY PATHOLOGIST: CPT | Mod: 26

## 2020-02-18 PROCEDURE — 49654: CPT | Mod: 80

## 2020-02-18 PROCEDURE — 49654: CPT

## 2020-02-18 PROCEDURE — 43774 LAP RMVL GASTR ADJ ALL PARTS: CPT | Mod: AS

## 2020-02-18 PROCEDURE — 49654: CPT | Mod: AS

## 2020-02-18 RX ORDER — ATORVASTATIN CALCIUM 80 MG/1
1 TABLET, FILM COATED ORAL
Qty: 0 | Refills: 0 | DISCHARGE

## 2020-02-18 RX ORDER — ONDANSETRON 8 MG/1
1 TABLET, FILM COATED ORAL
Qty: 20 | Refills: 0
Start: 2020-02-18 | End: 2020-02-22

## 2020-02-18 RX ORDER — METOCLOPRAMIDE HCL 10 MG
10 TABLET ORAL EVERY 6 HOURS
Refills: 0 | Status: DISCONTINUED | OUTPATIENT
Start: 2020-02-18 | End: 2020-02-19

## 2020-02-18 RX ORDER — OMEPRAZOLE 10 MG/1
1 CAPSULE, DELAYED RELEASE ORAL
Qty: 30 | Refills: 0
Start: 2020-02-18 | End: 2020-03-18

## 2020-02-18 RX ORDER — HEPARIN SODIUM 5000 [USP'U]/ML
5000 INJECTION INTRAVENOUS; SUBCUTANEOUS EVERY 8 HOURS
Refills: 0 | Status: DISCONTINUED | OUTPATIENT
Start: 2020-02-18 | End: 2020-02-19

## 2020-02-18 RX ORDER — SODIUM CHLORIDE 9 MG/ML
1000 INJECTION, SOLUTION INTRAVENOUS
Refills: 0 | Status: DISCONTINUED | OUTPATIENT
Start: 2020-02-18 | End: 2020-02-18

## 2020-02-18 RX ORDER — ACETAMINOPHEN 500 MG
1000 TABLET ORAL ONCE
Refills: 0 | Status: COMPLETED | OUTPATIENT
Start: 2020-02-18 | End: 2020-02-18

## 2020-02-18 RX ORDER — HYOSCYAMINE SULFATE 0.13 MG
0.12 TABLET ORAL EVERY 4 HOURS
Refills: 0 | Status: DISCONTINUED | OUTPATIENT
Start: 2020-02-18 | End: 2020-02-19

## 2020-02-18 RX ORDER — ACETAMINOPHEN 500 MG
975 TABLET ORAL EVERY 8 HOURS
Refills: 0 | Status: DISCONTINUED | OUTPATIENT
Start: 2020-02-19 | End: 2020-02-19

## 2020-02-18 RX ORDER — PANTOPRAZOLE SODIUM 20 MG/1
40 TABLET, DELAYED RELEASE ORAL EVERY 24 HOURS
Refills: 0 | Status: DISCONTINUED | OUTPATIENT
Start: 2020-02-18 | End: 2020-02-19

## 2020-02-18 RX ORDER — HYDROMORPHONE HYDROCHLORIDE 2 MG/ML
1 INJECTION INTRAMUSCULAR; INTRAVENOUS; SUBCUTANEOUS
Refills: 0 | Status: DISCONTINUED | OUTPATIENT
Start: 2020-02-18 | End: 2020-02-18

## 2020-02-18 RX ORDER — HEPARIN SODIUM 5000 [USP'U]/ML
5000 INJECTION INTRAVENOUS; SUBCUTANEOUS ONCE
Refills: 0 | Status: COMPLETED | OUTPATIENT
Start: 2020-02-18 | End: 2020-02-18

## 2020-02-18 RX ORDER — ACETAMINOPHEN 500 MG
30 TABLET ORAL
Qty: 150 | Refills: 0
Start: 2020-02-18 | End: 2020-02-22

## 2020-02-18 RX ORDER — ACETAMINOPHEN 500 MG
1000 TABLET ORAL ONCE
Refills: 0 | Status: COMPLETED | OUTPATIENT
Start: 2020-02-19 | End: 2020-02-19

## 2020-02-18 RX ORDER — SODIUM CHLORIDE 9 MG/ML
3 INJECTION INTRAMUSCULAR; INTRAVENOUS; SUBCUTANEOUS EVERY 8 HOURS
Refills: 0 | Status: DISCONTINUED | OUTPATIENT
Start: 2020-02-18 | End: 2020-02-18

## 2020-02-18 RX ORDER — KETOROLAC TROMETHAMINE 30 MG/ML
15 SYRINGE (ML) INJECTION EVERY 6 HOURS
Refills: 0 | Status: COMPLETED | OUTPATIENT
Start: 2020-02-18 | End: 2020-02-19

## 2020-02-18 RX ORDER — PANTOPRAZOLE SODIUM 20 MG/1
1 TABLET, DELAYED RELEASE ORAL
Qty: 0 | Refills: 0 | DISCHARGE

## 2020-02-18 RX ORDER — ONDANSETRON 8 MG/1
4 TABLET, FILM COATED ORAL EVERY 6 HOURS
Refills: 0 | Status: DISCONTINUED | OUTPATIENT
Start: 2020-02-18 | End: 2020-02-19

## 2020-02-18 RX ORDER — SODIUM CHLORIDE 9 MG/ML
1000 INJECTION, SOLUTION INTRAVENOUS
Refills: 0 | Status: COMPLETED | OUTPATIENT
Start: 2020-02-18 | End: 2020-02-19

## 2020-02-18 RX ORDER — HYOSCYAMINE SULFATE 0.13 MG
1 TABLET ORAL
Qty: 40 | Refills: 0
Start: 2020-02-18 | End: 2020-02-27

## 2020-02-18 RX ORDER — BUPIVACAINE 13.3 MG/ML
20 INJECTION, SUSPENSION, LIPOSOMAL INFILTRATION ONCE
Refills: 0 | Status: DISCONTINUED | OUTPATIENT
Start: 2020-02-18 | End: 2020-02-18

## 2020-02-18 RX ORDER — CEFAZOLIN SODIUM 1 G
2000 VIAL (EA) INJECTION EVERY 8 HOURS
Refills: 0 | Status: COMPLETED | OUTPATIENT
Start: 2020-02-18 | End: 2020-02-19

## 2020-02-18 RX ORDER — ONDANSETRON 8 MG/1
4 TABLET, FILM COATED ORAL ONCE
Refills: 0 | Status: DISCONTINUED | OUTPATIENT
Start: 2020-02-18 | End: 2020-02-18

## 2020-02-18 RX ORDER — SODIUM CHLORIDE 9 MG/ML
1000 INJECTION, SOLUTION INTRAVENOUS
Refills: 0 | Status: DISCONTINUED | OUTPATIENT
Start: 2020-02-18 | End: 2020-02-19

## 2020-02-18 RX ADMIN — HEPARIN SODIUM 5000 UNIT(S): 5000 INJECTION INTRAVENOUS; SUBCUTANEOUS at 10:57

## 2020-02-18 RX ADMIN — Medication 15 MILLIGRAM(S): at 21:22

## 2020-02-18 RX ADMIN — PANTOPRAZOLE SODIUM 40 MILLIGRAM(S): 20 TABLET, DELAYED RELEASE ORAL at 19:16

## 2020-02-18 RX ADMIN — Medication 15 MILLIGRAM(S): at 21:35

## 2020-02-18 RX ADMIN — Medication 100 MILLIGRAM(S): at 21:23

## 2020-02-18 RX ADMIN — HYDROMORPHONE HYDROCHLORIDE 1 MILLIGRAM(S): 2 INJECTION INTRAMUSCULAR; INTRAVENOUS; SUBCUTANEOUS at 19:09

## 2020-02-18 RX ADMIN — HYDROMORPHONE HYDROCHLORIDE 1 MILLIGRAM(S): 2 INJECTION INTRAMUSCULAR; INTRAVENOUS; SUBCUTANEOUS at 18:52

## 2020-02-18 RX ADMIN — ONDANSETRON 4 MILLIGRAM(S): 8 TABLET, FILM COATED ORAL at 19:16

## 2020-02-18 NOTE — BRIEF OPERATIVE NOTE - OPERATION/FINDINGS
patient with history of lap band s/p sleeve gastrectomy removal of lap band and repair ventral hernia. Abdomen entered under gudiance using optiview port. Laparoscopic take adhesion. Robot ports placed. Removal of lap band performed. Robotic vertical sleeve gastrectomy performed. Stomach specimen removed via endocatch. Umbilical fascia closed with vicryl and hernia repair laparoscopically with PDS. Skin closed with biopsyn steris and opsties.

## 2020-02-18 NOTE — DISCHARGE NOTE PROVIDER - NSDCFUSCHEDAPPT_GEN_ALL_CORE_FT
DUNCAN MATHEW ; 03/12/2020 ; [facility] [address] ; 04/09/2020 ; [facility] [address]

## 2020-02-18 NOTE — BRIEF OPERATIVE NOTE - NSICDXBRIEFPOSTOP_GEN_ALL_CORE_FT
POST-OP DIAGNOSIS:  Ventral hernia, recurrent 18-Feb-2020 17:11:46  Zeina Ahmadi  Morbid obesity 18-Feb-2020 17:11:38  Zeina Ahmadi

## 2020-02-18 NOTE — BRIEF OPERATIVE NOTE - NSICDXBRIEFPROCEDURE_GEN_ALL_CORE_FT
PROCEDURES:  Robot-assisted laparoscopic removal of gastric band 18-Feb-2020 17:10:45  Zeina Ahmadi  Robot-assisted sleeve gastrectomy 18-Feb-2020 17:10:22  Zeina Ahmadi  Repair, hernia, ventral, laparoscopic 18-Feb-2020 17:10:13  Zeina Ahmadi

## 2020-02-18 NOTE — DISCHARGE NOTE PROVIDER - HOSPITAL COURSE
On  2/18/20 Ms beard who has a history of  HLD, GERD and morbid obesity BMI 43, underwent Robotic Sleeve Gastrectomy. Bariatric ERAS protocol followed to include preoperative and perioperative use of DVT and SSI prophylaxis as well as multi-modal non-opioid analgesia. Patient tolerated the procedure well  extubated in the operating room then transferred to the PACU in stable condition. Once hemodynamically stable and effective pain control the patient was able to ambulate with assistance. Pt was also able to void within 4 hours following the procedure. The patient was later transferred to a bariatric unit and placed on bedside continuous pulse oximetry. Pain managment included IV multi-modal non-opioid analgesia then transitioned to liquid as needed. The patient tolerated bariatric clear liquid the evening of surgery.        On POD #1 patient remained stable with no acute events overnight, has effective  pain control. Denies nausea and vomiting. Patient is ambulating independently and voiding as expected. The rest of the hospital course was uneventful, patient met [facility]-Point Bariatric Surgery D/C criteria and subsequently cleared for discharge home on POD#1. No  extended VTE prophylaxis based on [facility] VTE Risk Stratification. The patient will follow a protocol-derived staged meal progression supervised by the dietitian in the outpatient setting. Patient will follow-up with Dr. Lei in 7-10 days, medical doctor and dietitian in 30 days. [facility] appropriate [facility] prior to discharge. Written discharge instruction explained.

## 2020-02-18 NOTE — DISCHARGE NOTE PROVIDER - NSDCCPCAREPLAN_GEN_ALL_CORE_FT
PRINCIPAL DISCHARGE DIAGNOSIS  Diagnosis: Morbid obesity  Assessment and Plan of Treatment: BATHING: Please do not submerge wound underwater. You may shower starting post op day #2. Remove outer clean dressings on post op day #5. Leave steri strips in place. They may fall off on their own.   ACTIVITY: No heavy lifting or straining. Otherwise, you may return to your usual level of physical activity. If you are taking narcotic pain medication (such as Percocet) DO NOT drive a car, operate machinery or make important decisions.  DIET: Please follow Bariatric diet protocol. You may begin your full liquids when returning home. Encourage protein filled liquids.     RETURN TO THE EMERGENCY DEPARTMENT for any of the following - worsening pain, fever/chills, nausea/vomiting, altered mental status, chest pain, shortness of breath, or any other new / worsening symptom. to your usual diet.  NOTIFY YOUR SURGEON IF: You have any bleeding that does not stop, any pus draining from your wound(s), any fever (over 100.4 F) or chills, persistent nausea/vomiting, persistent diarrhea, or if your pain is not controlled on your discharge medications.  FOLLOW-UP: Please follow up with your primary care physician within 3-4weeks after surgery and your bariatric surgeon as discussed. also ensure follow up and continued communication with your dietary team and other support services.

## 2020-02-18 NOTE — PROGRESS NOTE ADULT - PROBLEM SELECTOR PLAN 1
Continue to follow bariatric protocol  continue IV hydration overnight  encourage oob  NO narcotics  incentive spirometer  dvt ppx  NPO overnight but can have sips and chips  advance diet in am  likely dc after diet toleration

## 2020-02-18 NOTE — BRIEF OPERATIVE NOTE - NSICDXBRIEFPREOP_GEN_ALL_CORE_FT
PRE-OP DIAGNOSIS:  Ventral hernia, recurrent 18-Feb-2020 17:11:19  Zeina Ahmadi  Morbid obesity 18-Feb-2020 17:11:11  Zeina Ahmadi

## 2020-02-18 NOTE — PROGRESS NOTE ADULT - SUBJECTIVE AND OBJECTIVE BOX
Post-op Check    Subjective:  Pt offers no acute complaints at this time. Pain well controlled on current regiment. Denies chest pain, SOB, palpitations. Patient has ambulated and passed TOV.     STATUS POST:  Robot-assisted laparoscopic removal of gastric band, Robot-assisted sleeve gastrectomy   Repair, hernia, ventral, laparoscopic.      POST OPERATIVE DAY #: 0    MEDICATIONS  (STANDING):  acetaminophen  IVPB .. 1000 milliGRAM(s) IV Intermittent once  ceFAZolin   IVPB 2000 milliGRAM(s) IV Intermittent once  ceFAZolin   IVPB 2000 milliGRAM(s) IV Intermittent every 8 hours  heparin  Injectable 5000 Unit(s) SubCutaneous every 8 hours  ketorolac   Injectable 15 milliGRAM(s) IV Push every 6 hours  lactated ringers. 1000 milliLiter(s) (125 mL/Hr) IV Continuous <Continuous>  ondansetron Injectable 4 milliGRAM(s) IV Push every 6 hours  pantoprazole  Injectable 40 milliGRAM(s) IV Push every 24 hours  sodium chloride 0.9% 1000 milliLiter(s) (250 mL/Hr) IV Continuous <Continuous>    MEDICATIONS  (PRN):  hyoscyamine SL 0.125 milliGRAM(s) SubLingual every 4 hours PRN Gastric Spasms  LORazepam   Injectable 0.5 milliGRAM(s) IV Push once PRN Esophageal Spasm  metoclopramide Injectable 10 milliGRAM(s) IV Push every 6 hours PRN nausea      Vital Signs Last 24 Hrs  T(C): 37 (18 Feb 2020 20:30), Max: 37.2 (18 Feb 2020 16:00)  T(F): 98.6 (18 Feb 2020 20:30), Max: 99 (18 Feb 2020 16:00)  HR: 103 (18 Feb 2020 20:30) (78 - 103)  BP: 142/74 (18 Feb 2020 20:30) (71/53 - 147/54)  BP(mean): 76 (18 Feb 2020 20:00) (26 - 78)  RR: 18 (18 Feb 2020 20:30) (16 - 22)  SpO2: 91% (18 Feb 2020 20:30) (91% - 100%)    Physical Exam:    Constitutional: NAD  Respiratory: no accessory muscle use, respirations non-labored  Card: NSR on telemetry   GI: abdomen soft, obese, appropriate generalized ttp, surgical sites c/d/i  : Voiding spontaneously   Neurological: A&O x 3; without gross deficit    A:     P:  Continue current care  Pain control  OOB as tolerated  Encourage IS  DVT ppx

## 2020-02-18 NOTE — DISCHARGE NOTE PROVIDER - NSDCMRMEDTOKEN_GEN_ALL_CORE_FT
acetaminophen 160 mg/5 mL oral suspension: 30 milliliter(s) orally once a day   Levsin SL 0.125 mg sublingual tablet: 1 tab(s) sublingually every 6 hours, As Needed gastric spasm   omeprazole 40 mg oral delayed release capsule: 1 cap(s) orally once a day   open into diet   Zofran ODT 4 mg oral tablet, disintegratin tab(s) orally every 6 hours, As Needed -for nausea

## 2020-02-18 NOTE — DISCHARGE NOTE PROVIDER - CARE PROVIDER_API CALL
Chris Lei)  Surgery  [address] [place] [id number]  Phone: [phone]  Fax: [phone]  Follow Up Time:

## 2020-02-19 ENCOUNTER — TRANSCRIPTION ENCOUNTER (OUTPATIENT)
Age: 65
End: 2020-02-19

## 2020-02-19 VITALS
HEART RATE: 78 BPM | RESPIRATION RATE: 18 BRPM | DIASTOLIC BLOOD PRESSURE: 69 MMHG | SYSTOLIC BLOOD PRESSURE: 126 MMHG | OXYGEN SATURATION: 100 %

## 2020-02-19 LAB
ANION GAP SERPL CALC-SCNC: 14 MMOL/L — SIGNIFICANT CHANGE UP (ref 5–17)
BASOPHILS # BLD AUTO: 0.02 K/UL — SIGNIFICANT CHANGE UP (ref 0–0.2)
BASOPHILS NFR BLD AUTO: 0.1 % — SIGNIFICANT CHANGE UP (ref 0–2)
BUN SERPL-MCNC: 17 MG/DL — SIGNIFICANT CHANGE UP (ref 8–20)
CALCIUM SERPL-MCNC: 8.7 MG/DL — SIGNIFICANT CHANGE UP (ref 8.6–10.2)
CHLORIDE SERPL-SCNC: 103 MMOL/L — SIGNIFICANT CHANGE UP (ref 98–107)
CO2 SERPL-SCNC: 20 MMOL/L — LOW (ref 22–29)
CREAT SERPL-MCNC: 0.86 MG/DL — SIGNIFICANT CHANGE UP (ref 0.5–1.3)
EOSINOPHIL # BLD AUTO: 0 K/UL — SIGNIFICANT CHANGE UP (ref 0–0.5)
EOSINOPHIL NFR BLD AUTO: 0 % — SIGNIFICANT CHANGE UP (ref 0–6)
GLUCOSE SERPL-MCNC: 160 MG/DL — HIGH (ref 70–99)
HCT VFR BLD CALC: 33.7 % — LOW (ref 34.5–45)
HGB BLD-MCNC: 10.4 G/DL — LOW (ref 11.5–15.5)
IMM GRANULOCYTES NFR BLD AUTO: 0.4 % — SIGNIFICANT CHANGE UP (ref 0–1.5)
LYMPHOCYTES # BLD AUTO: 1.13 K/UL — SIGNIFICANT CHANGE UP (ref 1–3.3)
LYMPHOCYTES # BLD AUTO: 7.3 % — LOW (ref 13–44)
MCHC RBC-ENTMCNC: 28.3 PG — SIGNIFICANT CHANGE UP (ref 27–34)
MCHC RBC-ENTMCNC: 30.9 GM/DL — LOW (ref 32–36)
MCV RBC AUTO: 91.8 FL — SIGNIFICANT CHANGE UP (ref 80–100)
MONOCYTES # BLD AUTO: 1.05 K/UL — HIGH (ref 0–0.9)
MONOCYTES NFR BLD AUTO: 6.7 % — SIGNIFICANT CHANGE UP (ref 2–14)
NEUTROPHILS # BLD AUTO: 13.29 K/UL — HIGH (ref 1.8–7.4)
NEUTROPHILS NFR BLD AUTO: 85.5 % — HIGH (ref 43–77)
PLATELET # BLD AUTO: 390 K/UL — SIGNIFICANT CHANGE UP (ref 150–400)
POTASSIUM SERPL-MCNC: 4.2 MMOL/L — SIGNIFICANT CHANGE UP (ref 3.5–5.3)
POTASSIUM SERPL-SCNC: 4.2 MMOL/L — SIGNIFICANT CHANGE UP (ref 3.5–5.3)
RBC # BLD: 3.67 M/UL — LOW (ref 3.8–5.2)
RBC # FLD: 13.6 % — SIGNIFICANT CHANGE UP (ref 10.3–14.5)
SODIUM SERPL-SCNC: 137 MMOL/L — SIGNIFICANT CHANGE UP (ref 135–145)
WBC # BLD: 15.56 K/UL — HIGH (ref 3.8–10.5)
WBC # FLD AUTO: 15.56 K/UL — HIGH (ref 3.8–10.5)

## 2020-02-19 PROCEDURE — 82962 GLUCOSE BLOOD TEST: CPT

## 2020-02-19 PROCEDURE — 36415 COLL VENOUS BLD VENIPUNCTURE: CPT

## 2020-02-19 PROCEDURE — S2900: CPT

## 2020-02-19 PROCEDURE — C1889: CPT

## 2020-02-19 PROCEDURE — 85027 COMPLETE CBC AUTOMATED: CPT

## 2020-02-19 PROCEDURE — 80048 BASIC METABOLIC PNL TOTAL CA: CPT

## 2020-02-19 PROCEDURE — 88305 TISSUE EXAM BY PATHOLOGIST: CPT

## 2020-02-19 RX ADMIN — ONDANSETRON 4 MILLIGRAM(S): 8 TABLET, FILM COATED ORAL at 06:37

## 2020-02-19 RX ADMIN — Medication 15 MILLIGRAM(S): at 09:42

## 2020-02-19 RX ADMIN — Medication 1000 MILLIGRAM(S): at 00:40

## 2020-02-19 RX ADMIN — SODIUM CHLORIDE 250 MILLILITER(S): 9 INJECTION, SOLUTION INTRAVENOUS at 01:29

## 2020-02-19 RX ADMIN — ONDANSETRON 4 MILLIGRAM(S): 8 TABLET, FILM COATED ORAL at 01:29

## 2020-02-19 RX ADMIN — Medication 100 MILLIGRAM(S): at 07:04

## 2020-02-19 RX ADMIN — ONDANSETRON 4 MILLIGRAM(S): 8 TABLET, FILM COATED ORAL at 10:40

## 2020-02-19 RX ADMIN — HEPARIN SODIUM 5000 UNIT(S): 5000 INJECTION INTRAVENOUS; SUBCUTANEOUS at 09:42

## 2020-02-19 RX ADMIN — HEPARIN SODIUM 5000 UNIT(S): 5000 INJECTION INTRAVENOUS; SUBCUTANEOUS at 01:28

## 2020-02-19 RX ADMIN — Medication 400 MILLIGRAM(S): at 06:37

## 2020-02-19 RX ADMIN — Medication 400 MILLIGRAM(S): at 00:10

## 2020-02-19 RX ADMIN — Medication 15 MILLIGRAM(S): at 01:29

## 2020-02-19 RX ADMIN — Medication 15 MILLIGRAM(S): at 01:40

## 2020-02-19 RX ADMIN — Medication 15 MILLIGRAM(S): at 10:44

## 2020-02-19 RX ADMIN — Medication 1000 MILLIGRAM(S): at 07:07

## 2020-02-19 RX ADMIN — SODIUM CHLORIDE 125 MILLILITER(S): 9 INJECTION, SOLUTION INTRAVENOUS at 09:41

## 2020-02-19 NOTE — CHART NOTE - NSCHARTNOTEFT_GEN_A_CORE
patient seen and examined earlier this am   agree with previous NP note   post  labs reviewed , acceptable, h/h stable , pending am labs   patient tolerating diet   pain control with current meds   voiding and ambulating   diet education provided, follow up phone call number obtained   patient discharge done and meds at [facility]   cleared for discharge today once meets all 8 pt criteria, likely planned for discharge this late afternoon   cardiac monitor to come off at noon and d/c IVF as well if vitals WNL and still tolerating diet

## 2020-02-19 NOTE — DISCHARGE NOTE NURSING/CASE MANAGEMENT/SOCIAL WORK - PATIENT PORTAL LINK FT
You can access the FollowMyHealth Patient Portal offered by [facility] by registering at the following website: [email or web address]. By joining [employer]’s FollowMyHealth portal, you will also be able to view your health information using other applications (apps) compatible with our system.

## 2020-02-19 NOTE — CHART NOTE - NSCHARTNOTEFT_GEN_A_CORE
Bariatric Nutrition Note:    Diet: Diet, Regular:   Bariatric Clear Liquid (BARICLLIQ) (02-19-20 @ 03:08)      PO intake/tolerance: Pt reports tolerating CLD at this time.     Education: Provided pt with verbal/written literature on bariatric clear liquid diet (POD 1-2) and bariatric full liquid diet (POD 3-14). Pt demonstrates good understanding. Pt to follow up with outpatient RD after discharge. Bariatric Nutrition Note:    Diet: Diet, Regular:   Bariatric Clear Liquid (BARICLLIQ) (02-19-20 @ 03:08)      PO intake/tolerance: Pt reports some mild nausea at this time, encouraged to take clears slow and take small sips.     Education: Provided pt with verbal/written literature on bariatric clear liquid diet (POD 1-2) and bariatric full liquid diet (POD 3-14). Pt demonstrates good understanding. Pt to follow up with outpatient RD after discharge.

## 2020-02-19 NOTE — PROGRESS NOTE ADULT - SUBJECTIVE AND OBJECTIVE BOX
INTERVAL HPI/OVERNIGHT EVENTS: No acute events post-op, passed TOV, ambulating with no difficulties. No flatus or bm as of yet. Will see how patient tolerates diet this am    STATUS POST:  Robot-assisted sleeve gastrectomy, Repair, hernia, ventral, laparoscopic    POST OPERATIVE DAY #: 1    SUBJECTIVE:  Flatus:  NO             Bowel Movement:  NO  Pain Control Adequate:  YES  Nausea: NO            Vomiting: NO  Diarrhea:  NO         Constipation: NO     Chest Pain: NO    SOB:  NO    MEDICATIONS  (STANDING):  acetaminophen    Suspension .. 975 milliGRAM(s) Oral every 8 hours  acetaminophen  IVPB .. 1000 milliGRAM(s) IV Intermittent once  acetaminophen  IVPB .. 1000 milliGRAM(s) IV Intermittent once  ceFAZolin   IVPB 2000 milliGRAM(s) IV Intermittent once  ceFAZolin   IVPB 2000 milliGRAM(s) IV Intermittent every 8 hours  heparin  Injectable 5000 Unit(s) SubCutaneous every 8 hours  ketorolac   Injectable 15 milliGRAM(s) IV Push every 6 hours  lactated ringers. 1000 milliLiter(s) (125 mL/Hr) IV Continuous <Continuous>  ondansetron Injectable 4 milliGRAM(s) IV Push every 6 hours  pantoprazole  Injectable 40 milliGRAM(s) IV Push every 24 hours    MEDICATIONS  (PRN):  hyoscyamine SL 0.125 milliGRAM(s) SubLingual every 4 hours PRN Gastric Spasms  LORazepam   Injectable 0.5 milliGRAM(s) IV Push once PRN Esophageal Spasm  metoclopramide Injectable 10 milliGRAM(s) IV Push every 6 hours PRN nausea      Vital Signs Last 24 Hrs  T(C): 37.3 (18 Feb 2020 23:45), Max: 37.3 (18 Feb 2020 23:45)  T(F): 99.1 (18 Feb 2020 23:45), Max: 99.1 (18 Feb 2020 23:45)  HR: 78 (18 Feb 2020 23:45) (78 - 103)  BP: 149/73 (18 Feb 2020 23:45) (71/53 - 149/73)  BP(mean): 76 (18 Feb 2020 20:00) (26 - 78)  RR: 18 (18 Feb 2020 20:30) (16 - 22)  SpO2: 93% (18 Feb 2020 23:45) (91% - 100%)    PHYSICAL EXAM:      Constitutional: in good spirits     Respiratory: no respiratory distress, no conversational dyspnea, no supplemental o2 needed     Cardiovascular: NSR on telemetry     Gastrointestinal: abdomen soft, obese, surgical sites c/d/i, no guarding , no peritonitis, appropriate mild generalized ttp, no flatus, no bm     Genitourinary: voiding spontaneously     Extremities: atraumatic     Neurological: A&OX3    Skin: warm, dry and no rashes           I&O's Detail    18 Feb 2020 07:01  -  19 Feb 2020 03:33  --------------------------------------------------------  IN:    lactated ringers.: 375 mL  Total IN: 375 mL    OUT:    Voided: 250 mL  Total OUT: 250 mL    Total NET: 125 mL          LABS:                        12.5   27.55 )-----------( 445      ( 18 Feb 2020 17:05 )             39.8     02-18    137  |  103  |  17.0  ----------------------------<  160<H>  4.2   |  20.0<L>  |  0.86    Ca    8.7      18 Feb 2020 23:38            RADIOLOGY & ADDITIONAL STUDIES:

## 2020-02-19 NOTE — PROGRESS NOTE ADULT - PROBLEM SELECTOR PLAN 1
continue bariatric protocol  encourage oob   diet advanced this am, f.u tolerance   no narcotics  dvt ppx  iv lock once diet is fully tolerated  if patient tolerates diet, pain continues to be in control on current regimen, voiding spontaneously, and ambulating with no difficulties can be discharged home late morning

## 2020-02-19 NOTE — PROGRESS NOTE ADULT - ATTENDING COMMENTS
I saw and examined the patient, and reviewed  the history, operative findings and data with the patient and staff  Agree with note which was also reviewed and edited where appropriate.  D/W patient, RN, residents and ACPs    Doing well.  Check UOP.  Continue postop protocol.  Home when meets d/c criteria.

## 2020-02-19 NOTE — PHARMACOTHERAPY INTERVENTION NOTE - COMMENTS
Spoke to patient about absorption issues with medications and the need to crush tablets or open capsules for the next 30 days. Reinforced discussion points from previous counseling. Informed patient of new prescriptions sent to pharmacy.     Informed patient to stop pantoprazole and atorvastatin. Patient to follow up with primary care physician.

## 2020-02-24 LAB — SURGICAL PATHOLOGY STUDY: SIGNIFICANT CHANGE UP

## 2020-03-12 ENCOUNTER — APPOINTMENT (OUTPATIENT)
Dept: SURGERY | Facility: CLINIC | Age: 65
End: 2020-03-12
Payer: COMMERCIAL

## 2020-03-12 VITALS
RESPIRATION RATE: 16 BRPM | SYSTOLIC BLOOD PRESSURE: 119 MMHG | BODY MASS INDEX: 37.56 KG/M2 | OXYGEN SATURATION: 95 % | DIASTOLIC BLOOD PRESSURE: 79 MMHG | TEMPERATURE: 98.9 F | HEART RATE: 85 BPM | HEIGHT: 63 IN | WEIGHT: 212 LBS

## 2020-03-12 DIAGNOSIS — K95.09 OTHER COMPLICATIONS OF GASTRIC BAND PROCEDURE: ICD-10-CM

## 2020-03-12 DIAGNOSIS — K43.9 VENTRAL HERNIA W/OUT OBSTRUCTION OR GANGRENE: ICD-10-CM

## 2020-03-12 PROCEDURE — 99024 POSTOP FOLLOW-UP VISIT: CPT

## 2020-03-12 RX ORDER — ATORVASTATIN CALCIUM 80 MG/1
TABLET, FILM COATED ORAL
Refills: 0 | Status: DISCONTINUED | COMMUNITY
End: 2020-03-12

## 2020-04-14 PROBLEM — E78.00 PURE HYPERCHOLESTEROLEMIA, UNSPECIFIED: Chronic | Status: ACTIVE | Noted: 2020-02-11

## 2020-04-14 PROBLEM — K21.9 GASTRO-ESOPHAGEAL REFLUX DISEASE WITHOUT ESOPHAGITIS: Chronic | Status: ACTIVE | Noted: 2020-02-11

## 2020-04-21 NOTE — HISTORY OF PRESENT ILLNESS
[Medical Office: ([facility])___] : at the medical office located in  [Home] : at home, [unfilled] , at the time of the visit. [Patient] : the patient [Self] : self

## 2020-04-23 ENCOUNTER — APPOINTMENT (OUTPATIENT)
Dept: SURGERY | Facility: CLINIC | Age: 65
End: 2020-04-23
Payer: COMMERCIAL

## 2020-04-23 VITALS — WEIGHT: 207 LBS | HEIGHT: 63 IN | BODY MASS INDEX: 36.68 KG/M2

## 2020-04-23 PROCEDURE — 99024 POSTOP FOLLOW-UP VISIT: CPT

## 2020-04-23 NOTE — REASON FOR VISIT
[Gastric Sleeve] : gastric sleeve [de-identified] : 02/18/2020 [de-identified] : :Laparoscopic removal of lapband and port, laparoscopic lysis of intraabdominal adhesions, laparoscopic incisional hernia repair,  and robotassisted laparoscopic vertical sleeve gastrectomy.

## 2020-04-23 NOTE — REASON FOR VISIT
[Post Op: _________] : a [unfilled] post op visit [FreeTextEntry1] : s/p Laparoscopic removal of lapband and port, laparoscopic lysis of intraabdominal adhesions, laparoscopic incisional hernia repair,  and robotassisted laparoscopic vertical sleeve gastrectomy.

## 2020-04-23 NOTE — HISTORY OF PRESENT ILLNESS
[Home] : at home, [unfilled] , at the time of the visit. [Medical Office: ([facility])___] : at the medical office located in  [Patient] : the patient [Procedure: ___] : Procedure performed: [unfilled]  [Self] : self [Date of Surgery: ___] : Date of Surgery:   [unfilled] [Surgeon Name:   ___] : Surgeon Name: Dr. ZUÑIGA [Pre-Op Weight ___] : Pre-op weight was [unfilled] lbs [___ Months Post Op] : [unfilled] months [Phase 4] : Phase 4 [de-identified] : fells well.\par No complaints.\par No V/V/C/D\par No GERD\par No change in health since last visit. [FreeTextEntry2] : not getting out much 2/2 COVID-19 restrictions [Hyperlipidemia] : Hyperlipidemia

## 2020-04-23 NOTE — ASSESSMENT
[Today's Weight: ___] : Today's weight:[unfilled] lbs [___ Weeks Post Op] : [unfilled] weeks [Today's BMI: ___] : Today's BMI: [unfilled] [Previous Visit Weight: ___] : Previous visit weight: [unfilled] lbs [Cormobidities: ___] : Comorbidities: [unfilled] [de-identified] : Pt has lost 16# in 9 weeks since surgery. C/O constipation and takes miralax. Eating carbs, not drinking enough fluids or exercising enough. Discussed menu options, exercise regime and asked pt to increase fluid intake.  Otherwise oing well.\par \par Follow-up 3 months.\par No restrictions.

## 2020-08-28 NOTE — PROGRESS NOTE ADULT - PROVIDER SPECIALTY LIST ADULT
Bariatric Surgery [place] results:   No induration.  No swelling.  Redness 5 mm.    Jada ALBERTO RN BSN

## 2021-05-06 ENCOUNTER — APPOINTMENT (OUTPATIENT)
Dept: SURGERY | Facility: CLINIC | Age: 66
End: 2021-05-06
Payer: MEDICARE

## 2021-05-06 VITALS
HEIGHT: 63 IN | HEART RATE: 81 BPM | BODY MASS INDEX: 43.27 KG/M2 | WEIGHT: 244.2 LBS | SYSTOLIC BLOOD PRESSURE: 146 MMHG | DIASTOLIC BLOOD PRESSURE: 81 MMHG | TEMPERATURE: 97.6 F | OXYGEN SATURATION: 97 %

## 2021-05-06 DIAGNOSIS — K22.70 BARRETT'S ESOPHAGUS W/OUT DYSPLASIA: ICD-10-CM

## 2021-05-06 PROCEDURE — 99072 ADDL SUPL MATRL&STAF TM PHE: CPT

## 2021-05-06 PROCEDURE — 99214 OFFICE O/P EST MOD 30 MIN: CPT

## 2021-05-06 NOTE — ASSESSMENT
[___ Years Post Op] : [unfilled] years [Previous Visit Weight: ___] : Previous visit weight: [unfilled] lbs [Today's Weight: ___] : Today's weight:[unfilled] lbs [Today's BMI: ___] : Today's BMI: [unfilled] [Cormobidities: ___] : Comorbidities: [unfilled] [de-identified] : Plan:\par Refer to Dr. Edilma Everett for weight loss management\par Increase protein and lower carbs and decrease total caloric intake to less than 1200 a day\par Walk 30 min/day\par Upper GI to evaluate size and shape of sleeve\par Upper endoscopy with biopsy to evaluate mucosa and need for ablation\par Follow-up 3 months

## 2021-05-06 NOTE — HISTORY OF PRESENT ILLNESS
[Procedure: ___] : Procedure performed: [unfilled]  [Date of Surgery: ___] : Date of Surgery:   [unfilled] [Surgeon Name:   ___] : Surgeon Name: Dr. ZUÑIGA [Pre-Op Weight ___] : Pre-op weight was [unfilled] lbs [___ Years Post Op] : [unfilled] years [Phase 4] : Phase 4 [___Kcal] : [unfilled] Kcal [___gm Protein] : [unfilled] gm protein [Walking] : walking [Diabetes] : no Diabetes [de-identified] : Pt reported a weight gain of 20# over the last year. Pt suffered from severe anxiety during the pandemic and current events. Had to go on medications for tremors that caused weight gain. Pt looking for solutions to aid in additional weight loss.  She denies nausea or vomiting and excessive carbohydrate intake.  Still has reasonable restriction [Hypertension] : no Hypertension [Sleep Apnea] : no Sleep Apnea [GERD] : no GERD [Hyperlipidemia] : no Hyperlipidemia

## 2021-05-06 NOTE — PHYSICAL EXAM
[de-identified] : Non icteric [de-identified] : CTA [de-identified] : NSR [de-identified] : NTND [de-identified] : Healed [de-identified] : 0

## 2021-06-08 ENCOUNTER — OUTPATIENT (OUTPATIENT)
Dept: OUTPATIENT SERVICES | Facility: HOSPITAL | Age: 66
LOS: 1 days | End: 2021-06-08
Payer: COMMERCIAL

## 2021-06-08 DIAGNOSIS — Z98.84 BARIATRIC SURGERY STATUS: Chronic | ICD-10-CM

## 2021-06-08 DIAGNOSIS — R63.5 ABNORMAL WEIGHT GAIN: ICD-10-CM

## 2021-06-08 DIAGNOSIS — Z90.89 ACQUIRED ABSENCE OF OTHER ORGANS: Chronic | ICD-10-CM

## 2021-06-08 DIAGNOSIS — Z98.891 HISTORY OF UTERINE SCAR FROM PREVIOUS SURGERY: Chronic | ICD-10-CM

## 2021-06-08 DIAGNOSIS — E66.01 MORBID (SEVERE) OBESITY DUE TO EXCESS CALORIES: ICD-10-CM

## 2021-06-08 DIAGNOSIS — Z98.890 OTHER SPECIFIED POSTPROCEDURAL STATES: Chronic | ICD-10-CM

## 2021-06-08 DIAGNOSIS — Z09 ENCOUNTER FOR FOLLOW-UP EXAMINATION AFTER COMPLETED TREATMENT FOR CONDITIONS OTHER THAN MALIGNANT NEOPLASM: ICD-10-CM

## 2021-06-08 DIAGNOSIS — Z90.49 ACQUIRED ABSENCE OF OTHER SPECIFIED PARTS OF DIGESTIVE TRACT: Chronic | ICD-10-CM

## 2021-06-08 DIAGNOSIS — K43.2 INCISIONAL HERNIA WITHOUT OBSTRUCTION OR GANGRENE: Chronic | ICD-10-CM

## 2021-06-08 DIAGNOSIS — Z98.84 BARIATRIC SURGERY STATUS: ICD-10-CM

## 2021-06-08 PROCEDURE — 74246 X-RAY XM UPR GI TRC 2CNTRST: CPT | Mod: 26

## 2021-06-08 PROCEDURE — 74246 X-RAY XM UPR GI TRC 2CNTRST: CPT

## 2021-06-16 ENCOUNTER — APPOINTMENT (OUTPATIENT)
Dept: BARIATRICS/WEIGHT MGMT | Facility: CLINIC | Age: 66
End: 2021-06-16
Payer: MEDICARE

## 2021-06-16 VITALS
WEIGHT: 254 LBS | DIASTOLIC BLOOD PRESSURE: 85 MMHG | OXYGEN SATURATION: 97 % | SYSTOLIC BLOOD PRESSURE: 145 MMHG | TEMPERATURE: 97.6 F | BODY MASS INDEX: 47.34 KG/M2 | HEART RATE: 120 BPM | HEIGHT: 61.5 IN | RESPIRATION RATE: 24 BRPM

## 2021-06-16 DIAGNOSIS — E78.5 HYPERLIPIDEMIA, UNSPECIFIED: ICD-10-CM

## 2021-06-16 DIAGNOSIS — K76.0 FATTY (CHANGE OF) LIVER, NOT ELSEWHERE CLASSIFIED: ICD-10-CM

## 2021-06-16 DIAGNOSIS — E66.01 MORBID (SEVERE) OBESITY DUE TO EXCESS CALORIES: ICD-10-CM

## 2021-06-16 DIAGNOSIS — F41.9 ANXIETY DISORDER, UNSPECIFIED: ICD-10-CM

## 2021-06-16 DIAGNOSIS — R63.5 ABNORMAL WEIGHT GAIN: ICD-10-CM

## 2021-06-16 DIAGNOSIS — K59.09 OTHER CONSTIPATION: ICD-10-CM

## 2021-06-16 PROCEDURE — 99204 OFFICE O/P NEW MOD 45 MIN: CPT

## 2021-06-16 RX ORDER — ESCITALOPRAM OXALATE 20 MG/1
20 TABLET, FILM COATED ORAL
Refills: 0 | Status: ACTIVE | COMMUNITY
Start: 2021-06-16

## 2021-06-16 RX ORDER — AMITRIPTYLINE HYDROCHLORIDE 50 MG/1
50 TABLET, FILM COATED ORAL
Refills: 0 | Status: ACTIVE | COMMUNITY
Start: 2021-06-16

## 2021-06-16 NOTE — HISTORY OF PRESENT ILLNESS
[Improved Health] : Improved health [Improved Mobility] : Improved mobility [Improve Mood] : Improved mood [Young Adult] : yound adult [Sleeve] : Bariatric surgery (sleeve) [Lap Band] : Bariatric surgery (lap band) [Year of Surgery: _____] : Year of surgery: [unfilled] [Weight Before Surgery: ___] : Weight before surgery: [unfilled] [For how long: _____] : For how long: [unfilled] [Poor eating habits] : poor eating habits [Cravings] : cravings [Portions/overeating] : portions/overeating [Depression/anxiety] : depression/anxiety [Medical conditions] : medical conditions [5] : 5 [Breakfast] : breakfast [Lunch] : lunch [Dinner] : dinner [Evening] : evening [Crunchy] : crunchy [Salty] : salty [Sweet] : sweet [Chocolate] : chocolate [1-2] : Meat, fish, poultry, eggs, cheese: 1-2 [3-5] : Sweets: 3-5 [>7] : Fast food - meals per week: >7 [1] : Fried foods per week: 1 [4] : How many cups of water do you regularly drink per day: 4 [2] : Cups of coffee per day: 2 [Creamer] : creamer [Other: ____] : [unfilled] [Self] : self [Overlarge portions] : overlarge portions [Frequent Snacks] : frequent snacks [Eat Fast] : eat fast [Emotional Eating] : emotional eating [Boredom Eating] : boredom eating [2 blocks] : Walking distance capability:2 blocks [Walking] : walking [Cardio machines: treadmill/spinning/elliptical] : cardio machines: treadmill/spinning/elliptical [0] : 0 [None] : none [Band with removal] : band with removal [VSG] : VSG [GERD] : GERD [Other: ___] : [unfilled] [FreeTextEntry3] : 323 [FreeTextEntry1] : 65 year old woman who has a hx of lap band with removal and then VSG and  over the past year was place on Lexapro and Elavil ( started  in 7/20) Seen by her PMD and being tapered off them Since starting the meds her wt went from about 207  now up to 254( 10 lbs in the last 6 weeks) . She also recently dev plantar facietis. Of late since her  retired they have been eating out for every meal and she eats poorly , in terms of the quality of her food \par \par Breakfast: \par [employer]: busciut with jam  and coffee with creamer \par \par no snack \par \par Lunch: \par [employer] or hot dog\par 100 audrey  bagel with butter \par  \par snack \par pretzels \par \par Dinner 5-6\par [employer]: [employer] with cheese, french fries, \par \par Snack : 8 pm \par pretzels , devil dog, pop corn\par  [] : No

## 2021-06-16 NOTE — CONSULT LETTER
[Dear  ___] : Dear  [unfilled], [Consult Letter:] : I had the pleasure of evaluating your patient, [unfilled]. [Please see my note below.] : Please see my note below. [Consult Closing:] : Thank you very much for allowing me to participate in the care of this patient.  If you have any questions, please do not hesitate to contact me. [Sincerely,] : Sincerely, [FreeTextEntry3] : Edilma Everett MD

## 2021-06-16 NOTE — ASSESSMENT
[FreeTextEntry1] : 65 year old woman with a history of lap-band with removal and VSG who had a 40-50 lb wt regain , exacerbated by Lexapro and Elavil who despite statement of not eating a lot eats poorly , eats out for almost every meal and usually [employer], [employer], fast food, snacks late and poorly, no exercise( going to [place] on 6/23-7/29- has a pool she can exercise in ) \par \par 1. Labs  for one year post bariatric : A1c = 5.8, Vit D is low \par 2 continue to taper off the Elavil per MD  and off the Lexapro if tolerated \par 3 consider metformin after labs are available \par 4 Stop eating out\par 5 eat vegetables \par 6 eat fruit \par 7 re start premier protein for breakfast \par 8 exercise( susannah in her pool) : goal 150 min week \par 9 follow up  with Munira\par follow up in Aug when she returns from [place]\par \par

## 2021-07-21 ENCOUNTER — APPOINTMENT (OUTPATIENT)
Dept: GASTROENTEROLOGY | Facility: CLINIC | Age: 66
End: 2021-07-21

## 2021-08-05 ENCOUNTER — APPOINTMENT (OUTPATIENT)
Dept: PLASTIC SURGERY | Facility: CLINIC | Age: 66
End: 2021-08-05

## 2021-08-11 ENCOUNTER — APPOINTMENT (OUTPATIENT)
Dept: BARIATRICS/WEIGHT MGMT | Facility: CLINIC | Age: 66
End: 2021-08-11

## 2021-08-12 ENCOUNTER — APPOINTMENT (OUTPATIENT)
Dept: PLASTIC SURGERY | Facility: CLINIC | Age: 66
End: 2021-08-12
Payer: SELF-PAY

## 2021-08-12 VITALS
HEART RATE: 111 BPM | DIASTOLIC BLOOD PRESSURE: 83 MMHG | SYSTOLIC BLOOD PRESSURE: 156 MMHG | OXYGEN SATURATION: 96 % | BODY MASS INDEX: 46.01 KG/M2 | TEMPERATURE: 98.3 F | WEIGHT: 250 LBS | HEIGHT: 62 IN

## 2021-08-12 PROCEDURE — 99213 OFFICE O/P EST LOW 20 MIN: CPT

## 2021-11-01 NOTE — CONSULT LETTER
[Dear  ___] : Dear  [unfilled], [Consult Letter:] : I had the pleasure of evaluating your patient, [unfilled]. [Please see my note below.] : Please see my note below. [Consult Closing:] : Thank you very much for allowing me to participate in the care of this patient.  If you have any questions, please do not hesitate to contact me. [Sincerely,] : Sincerely, [FreeTextEntry3] : Dennys Garcia MD

## 2021-11-12 NOTE — PHYSICAL EXAM
[de-identified] : The patient is ambulatory, well groomed, no signs of cachexia. [de-identified] : Normocephalic, atraumatic. [de-identified] : No conjunctival erythema, hyperemia, or discharge bilaterally; no ectropion, entropion, lagophthalmos, or lid malposition bilaterally. Pupils are equal, round, and reactive to light bilaterally. [de-identified] : There are no masses, scars, or lesions of the external ear. External nose is midline with no scars or masses. Gross hearing intact bilaterally (finger rub). Nasal mucosa has no edema, lesions, or signs of desiccation. Lips and gums have no masses, lesions, friability, or edema. [de-identified] : Neck is soft without edema, masses, or crepitus. Trachea midline. No thyromegaly; no palpable thyroid nodules. [de-identified] : No sign of respiratory distress, no tachypnea, no use of accessory respiratory muscles. [de-identified] : No swelling, tenderness, or varicosities of the extremities bilaterally; extremities are warm to palpation and pedal pulses intact. [de-identified] : On examination, patient has normal gait and station. [de-identified] : The patient is alert and oriented to time, place, and person. No obvious disorder of mood or affect such as anxiety or agitation.

## 2021-11-12 NOTE — REASON FOR VISIT
[Follow-Up: _____] : a [unfilled] follow-up visit [Consultation] : a consultation visit [FreeTextEntry1] : Dr. Chris Lei (Surgery)

## 2022-03-31 ENCOUNTER — APPOINTMENT (OUTPATIENT)
Dept: PLASTIC SURGERY | Facility: CLINIC | Age: 67
End: 2022-03-31
Payer: MEDICARE

## 2022-03-31 VITALS
TEMPERATURE: 97.4 F | SYSTOLIC BLOOD PRESSURE: 112 MMHG | BODY MASS INDEX: 42.17 KG/M2 | OXYGEN SATURATION: 96 % | HEIGHT: 63 IN | HEART RATE: 105 BPM | DIASTOLIC BLOOD PRESSURE: 58 MMHG | WEIGHT: 238 LBS

## 2022-03-31 DIAGNOSIS — Z98.84 BARIATRIC SURGERY STATUS: ICD-10-CM

## 2022-03-31 DIAGNOSIS — E66.01 MORBID (SEVERE) OBESITY DUE TO EXCESS CALORIES: ICD-10-CM

## 2022-03-31 PROCEDURE — 99213 OFFICE O/P EST LOW 20 MIN: CPT

## 2022-04-06 ENCOUNTER — OUTPATIENT (OUTPATIENT)
Dept: OUTPATIENT SERVICES | Facility: HOSPITAL | Age: 67
LOS: 1 days | End: 2022-04-06
Payer: SELF-PAY

## 2022-04-06 VITALS
TEMPERATURE: 99 F | WEIGHT: 235.89 LBS | DIASTOLIC BLOOD PRESSURE: 74 MMHG | RESPIRATION RATE: 16 BRPM | SYSTOLIC BLOOD PRESSURE: 148 MMHG | HEIGHT: 61 IN | HEART RATE: 80 BPM | OXYGEN SATURATION: 96 %

## 2022-04-06 DIAGNOSIS — Z98.890 OTHER SPECIFIED POSTPROCEDURAL STATES: Chronic | ICD-10-CM

## 2022-04-06 DIAGNOSIS — E88.1 LIPODYSTROPHY, NOT ELSEWHERE CLASSIFIED: ICD-10-CM

## 2022-04-06 DIAGNOSIS — Z98.84 BARIATRIC SURGERY STATUS: Chronic | ICD-10-CM

## 2022-04-06 DIAGNOSIS — Z90.49 ACQUIRED ABSENCE OF OTHER SPECIFIED PARTS OF DIGESTIVE TRACT: Chronic | ICD-10-CM

## 2022-04-06 DIAGNOSIS — Z90.89 ACQUIRED ABSENCE OF OTHER ORGANS: Chronic | ICD-10-CM

## 2022-04-06 DIAGNOSIS — K43.2 INCISIONAL HERNIA WITHOUT OBSTRUCTION OR GANGRENE: Chronic | ICD-10-CM

## 2022-04-06 DIAGNOSIS — Z98.891 HISTORY OF UTERINE SCAR FROM PREVIOUS SURGERY: Chronic | ICD-10-CM

## 2022-04-06 DIAGNOSIS — Z01.818 ENCOUNTER FOR OTHER PREPROCEDURAL EXAMINATION: ICD-10-CM

## 2022-04-06 DIAGNOSIS — Z98.84 BARIATRIC SURGERY STATUS: ICD-10-CM

## 2022-04-06 LAB
ANION GAP SERPL CALC-SCNC: 10 MMOL/L — SIGNIFICANT CHANGE UP (ref 5–17)
BUN SERPL-MCNC: 19 MG/DL — SIGNIFICANT CHANGE UP (ref 7–23)
CALCIUM SERPL-MCNC: 9.4 MG/DL — SIGNIFICANT CHANGE UP (ref 8.4–10.5)
CHLORIDE SERPL-SCNC: 105 MMOL/L — SIGNIFICANT CHANGE UP (ref 96–108)
CO2 SERPL-SCNC: 27 MMOL/L — SIGNIFICANT CHANGE UP (ref 22–31)
CREAT SERPL-MCNC: 0.91 MG/DL — SIGNIFICANT CHANGE UP (ref 0.5–1.3)
EGFR: 69 ML/MIN/1.73M2 — SIGNIFICANT CHANGE UP
GLUCOSE SERPL-MCNC: 95 MG/DL — SIGNIFICANT CHANGE UP (ref 70–99)
HCT VFR BLD CALC: 41.7 % — SIGNIFICANT CHANGE UP (ref 34.5–45)
HGB BLD-MCNC: 13.4 G/DL — SIGNIFICANT CHANGE UP (ref 11.5–15.5)
MCHC RBC-ENTMCNC: 28.3 PG — SIGNIFICANT CHANGE UP (ref 27–34)
MCHC RBC-ENTMCNC: 32.1 GM/DL — SIGNIFICANT CHANGE UP (ref 32–36)
MCV RBC AUTO: 88.2 FL — SIGNIFICANT CHANGE UP (ref 80–100)
NRBC # BLD: 0 /100 WBCS — SIGNIFICANT CHANGE UP (ref 0–0)
PLATELET # BLD AUTO: 452 K/UL — HIGH (ref 150–400)
POTASSIUM SERPL-MCNC: 4.4 MMOL/L — SIGNIFICANT CHANGE UP (ref 3.5–5.3)
POTASSIUM SERPL-SCNC: 4.4 MMOL/L — SIGNIFICANT CHANGE UP (ref 3.5–5.3)
RBC # BLD: 4.73 M/UL — SIGNIFICANT CHANGE UP (ref 3.8–5.2)
RBC # FLD: 15 % — HIGH (ref 10.3–14.5)
SODIUM SERPL-SCNC: 142 MMOL/L — SIGNIFICANT CHANGE UP (ref 135–145)
WBC # BLD: 8.35 K/UL — SIGNIFICANT CHANGE UP (ref 3.8–10.5)
WBC # FLD AUTO: 8.35 K/UL — SIGNIFICANT CHANGE UP (ref 3.8–10.5)

## 2022-04-06 PROCEDURE — G0463: CPT

## 2022-04-06 PROCEDURE — 80048 BASIC METABOLIC PNL TOTAL CA: CPT

## 2022-04-06 PROCEDURE — 85027 COMPLETE CBC AUTOMATED: CPT

## 2022-04-06 PROCEDURE — 36415 COLL VENOUS BLD VENIPUNCTURE: CPT

## 2022-04-06 PROCEDURE — 93005 ELECTROCARDIOGRAM TRACING: CPT

## 2022-04-06 PROCEDURE — 93010 ELECTROCARDIOGRAM REPORT: CPT | Mod: NC

## 2022-04-06 NOTE — H&P PST ADULT - FALL HARM RISK - UNIVERSAL INTERVENTIONS
Bed in lowest position, wheels locked, appropriate side rails in place/Call bell, personal items and telephone in reach/Instruct patient to call for assistance before getting out of bed or chair/Non-slip footwear when patient is out of bed/Equality to call system/Physically safe environment - no spills, clutter or unnecessary equipment/Purposeful Proactive Rounding/Room/bathroom lighting operational, light cord in reach

## 2022-04-06 NOTE — H&P PST ADULT - HISTORY OF PRESENT ILLNESS
67 y/o female patient presents for PST. Per patient she is unhappy with her appearance of her hips and her hips impedes her ability to walk. Patient denies any hx of covid and stated that she is fully vaccinated.

## 2022-04-06 NOTE — H&P PST ADULT - PROBLEM SELECTOR PLAN 1
Patient scheduled for liposuction of bilateral thighs. Labs, EKG, covid testing and medical clearance pending.

## 2022-04-10 ENCOUNTER — TRANSCRIPTION ENCOUNTER (OUTPATIENT)
Age: 67
End: 2022-04-10

## 2022-04-11 ENCOUNTER — RESULT REVIEW (OUTPATIENT)
Age: 67
End: 2022-04-11

## 2022-04-11 ENCOUNTER — TRANSCRIPTION ENCOUNTER (OUTPATIENT)
Age: 67
End: 2022-04-11

## 2022-04-11 ENCOUNTER — OUTPATIENT (OUTPATIENT)
Dept: OUTPATIENT SERVICES | Facility: HOSPITAL | Age: 67
LOS: 1 days | End: 2022-04-11
Payer: SELF-PAY

## 2022-04-11 ENCOUNTER — APPOINTMENT (OUTPATIENT)
Dept: PLASTIC SURGERY | Facility: HOSPITAL | Age: 67
End: 2022-04-11
Payer: SELF-PAY

## 2022-04-11 VITALS
TEMPERATURE: 99 F | WEIGHT: 235.89 LBS | HEART RATE: 67 BPM | DIASTOLIC BLOOD PRESSURE: 71 MMHG | OXYGEN SATURATION: 98 % | SYSTOLIC BLOOD PRESSURE: 147 MMHG | RESPIRATION RATE: 20 BRPM | HEIGHT: 61 IN

## 2022-04-11 VITALS
TEMPERATURE: 98 F | HEART RATE: 60 BPM | OXYGEN SATURATION: 98 % | RESPIRATION RATE: 18 BRPM | SYSTOLIC BLOOD PRESSURE: 118 MMHG | DIASTOLIC BLOOD PRESSURE: 78 MMHG

## 2022-04-11 DIAGNOSIS — E88.1 LIPODYSTROPHY, NOT ELSEWHERE CLASSIFIED: ICD-10-CM

## 2022-04-11 DIAGNOSIS — Z98.84 BARIATRIC SURGERY STATUS: ICD-10-CM

## 2022-04-11 DIAGNOSIS — Z90.89 ACQUIRED ABSENCE OF OTHER ORGANS: Chronic | ICD-10-CM

## 2022-04-11 DIAGNOSIS — Z98.890 OTHER SPECIFIED POSTPROCEDURAL STATES: Chronic | ICD-10-CM

## 2022-04-11 DIAGNOSIS — Z98.891 HISTORY OF UTERINE SCAR FROM PREVIOUS SURGERY: Chronic | ICD-10-CM

## 2022-04-11 DIAGNOSIS — Z98.84 BARIATRIC SURGERY STATUS: Chronic | ICD-10-CM

## 2022-04-11 DIAGNOSIS — K43.2 INCISIONAL HERNIA WITHOUT OBSTRUCTION OR GANGRENE: Chronic | ICD-10-CM

## 2022-04-11 DIAGNOSIS — Z90.49 ACQUIRED ABSENCE OF OTHER SPECIFIED PARTS OF DIGESTIVE TRACT: Chronic | ICD-10-CM

## 2022-04-11 PROCEDURE — 15832 EXC EXCESSIVE SKIN THIGH: CPT | Mod: 50

## 2022-04-11 PROCEDURE — 15879 SUCTION LIPECTOMY LWR EXTREM: CPT | Mod: 50

## 2022-04-11 PROCEDURE — 88304 TISSUE EXAM BY PATHOLOGIST: CPT | Mod: 26

## 2022-04-11 PROCEDURE — C1889: CPT

## 2022-04-11 PROCEDURE — 88304 TISSUE EXAM BY PATHOLOGIST: CPT

## 2022-04-11 PROCEDURE — C9399: CPT

## 2022-04-11 DEVICE — HEMOSTAT ARISTA 3GR: Type: IMPLANTABLE DEVICE | Site: BILATERAL | Status: FUNCTIONAL

## 2022-04-11 DEVICE — CLIP APPLIER ETHICON LIGACLIP 11.5" MEDIUM: Type: IMPLANTABLE DEVICE | Site: BILATERAL | Status: FUNCTIONAL

## 2022-04-11 RX ORDER — OXYCODONE AND ACETAMINOPHEN 5; 325 MG/1; MG/1
1 TABLET ORAL EVERY 4 HOURS
Refills: 0 | Status: DISCONTINUED | OUTPATIENT
Start: 2022-04-11 | End: 2022-04-11

## 2022-04-11 RX ORDER — HYDROMORPHONE HYDROCHLORIDE 2 MG/ML
0.5 INJECTION INTRAMUSCULAR; INTRAVENOUS; SUBCUTANEOUS
Refills: 0 | Status: DISCONTINUED | OUTPATIENT
Start: 2022-04-11 | End: 2022-04-11

## 2022-04-11 RX ORDER — SODIUM CHLORIDE 9 MG/ML
1000 INJECTION, SOLUTION INTRAVENOUS
Refills: 0 | Status: DISCONTINUED | OUTPATIENT
Start: 2022-04-11 | End: 2022-04-11

## 2022-04-11 RX ORDER — ONDANSETRON 8 MG/1
4 TABLET, FILM COATED ORAL ONCE
Refills: 0 | Status: DISCONTINUED | OUTPATIENT
Start: 2022-04-11 | End: 2022-04-11

## 2022-04-11 RX ORDER — SODIUM CHLORIDE 9 MG/ML
1000 INJECTION, SOLUTION INTRAVENOUS
Refills: 0 | Status: DISCONTINUED | OUTPATIENT
Start: 2022-04-11 | End: 2022-04-25

## 2022-04-11 RX ORDER — HYDROMORPHONE HYDROCHLORIDE 2 MG/ML
1 INJECTION INTRAMUSCULAR; INTRAVENOUS; SUBCUTANEOUS
Refills: 0 | Status: DISCONTINUED | OUTPATIENT
Start: 2022-04-11 | End: 2022-04-11

## 2022-04-11 RX ADMIN — SODIUM CHLORIDE 50 MILLILITER(S): 9 INJECTION, SOLUTION INTRAVENOUS at 07:25

## 2022-04-11 NOTE — ASU PATIENT PROFILE, ADULT - PREOP PAIN SCORE
"Telephone Encounter by Gautam Singh at 08/08/17 08:03 AM     Author:  Gautam Singh Service:  (none) Author Type:  Patient      Filed:  08/08/17 08:07 AM Encounter Date:  2017 Status:  Signed     :  Gautam Singh (Patient )              Hali Hart    Patient Age: 11 month old    ACCT STATUS:   MESSAGE:[JC1.1T]   Mother calling stating that pt continues to have a slight cough, denies difficulty breathing. Mother states that pt does not have a fever today, but pt continues to tug and hold his ear, no new symptoms. [JC1.1M]  Caller informed call being connected to nurse for triage. Appointment scheduled for[JC1.1T] today[JC1.1M] with[JC1.1T] Dr. Stella Sanchez at[JC1.1T] 2:00pm[JC1.1M]. Call connected to [facility] Triage queue. Routed to [facility]. Next and Last Visit with Provider and Department  Next visit with Devon Garcia is on 2017 at  1:00 PM in [facility]  Next visit with PEDIATRICS is on 2017 at  1:00 PM in [address] [place] [address] visit with Devon Garcia was on 2017 at 12:00 PM in [address] [place] [address] visit with PEDIATRICS was on 2017 at 12:00 PM in [address]: As of 2017 weight is 18.8 lbs. (8.528 kg). Height is 2' 3.25""(0.692 m). BMI is 17.81 kg/(m^2) calculated from:     Height 2' 3.25\"" (0.692 m) as of 7/19/17     Weight 18 lb 12.8 oz (8.528 kg) as of 7/19/17[JC1.1T]      No Known Allergies[JC1.2T]  Current outpatient prescriptions       Medication  Sig Dispense Refill   â¢ lactulose (CHRONULAC) 10 GM/15ML solution 2.5 ml by mouth twice daily as needed for constipation.  150 mL 0      PHARMACY to use:           Pharmacy preference(s) on file:    [facility] [address]  [facility] INFO:[JC1.1T] Ok to leave response (including medical information) on answering machine[JC1.1M]  ROUTING:[JC1.1T] Patient's physician/staff[JC1.1M]    " PCP: Salinas Forrester. Queta Jarquin MD         INS: Cecilio Jimenez HMO / Plan: *No Plan* / Product Type: *No Product type* / Note: This is the primary coverage, but no account was found for this location or the patient's primary location.    ADDRESS:  [place] [id number][JC1.1T]       Revision History        User Key Date/Time User Provider Type Action    > JC1.2 08/08/17 08:07 AM Simeon Myers Patient  Sign     JC1.1 08/08/17 08:03 AM Simeon Myers Patient      M - Manual, T - Template 0

## 2022-04-11 NOTE — ASU DISCHARGE PLAN (ADULT/PEDIATRIC) - COMMENTS
Dr. Garcia's  direct phone number is [phone]. If after office hours (9-5PM M-F), then please wait until normal business hours to call.   You may leave a detailed VM as well. You may also email [email or web address] for any concerns or questions regarding scheduling appointments.  You will see Dr. Garcia's NAKUL Scott, (Ermelinda MOTA) for your post operative visit.  You may also contact her directly via email: [email or web address] for any concerns or questions.

## 2022-04-11 NOTE — ASU DISCHARGE PLAN (ADULT/PEDIATRIC) - ACTIVITY LEVEL
Do not sleep or lay on your side. Do not bend down, squat, or do anything strenuous./No excercise/No heavy lifting/No sports/gym/No weight bearing/No tub baths/No intercourse

## 2022-04-11 NOTE — BRIEF OPERATIVE NOTE - OPERATION/FINDINGS
I was present for the entire length of the case, there was no other qualified resident to assist  I assisted with hemostasis, exposure, creating of flaps, closure of wound, and placement of dressings.    liposuction + removal

## 2022-04-11 NOTE — ASU DISCHARGE PLAN (ADULT/PEDIATRIC) - ASU DC SPECIAL INSTRUCTIONSFT
1. Please follow up with Dr. Garcia's PA, Sonia, next week TUESDAY 04/19/22 for your first post operative visit. Your appointment has already been made. Please call the office if you need to make any changes to your appointment at [phone] (during normal business hours M-F 9-5pm).     2. Activity:   Please avoid any strenuous activities, AVOID bending, stretching, reaching overhead, or any heavy lifting.    3. Dressings:   Some OOZING and blood on the dressing is normal and to be expected. If it becomes saturated w/ BRIGHT red blood that does not stop, please call [phone] for email SONIA: [email or web address]    4. Medications:  Your medications were sent to your pharmacy.  Please take the prescribed medications as directed.   For MILD pain please take regular over the counter pain medications such as: Advil, Tylenol, Motrin.   Only take the narcotic prescribed pain medication for SEVERE PAIN.   If constipation occurs after taking narcotic pain medications, please take an over the counter stool softener. 1. Please follow up with Dr. Garcia's PA, Sonia, next week TUESDAY 04/19/22 for your first post operative visit. Your appointment has already been made. Please call the office if you need to make any changes to your appointment at [phone] (during normal business hours M-F 9-5pm).     2. Activity:   Please avoid any strenuous activities, AVOID bending, stretching, reaching overhead, or any heavy lifting.  Avoid walking wide steps, no bending down or squatting.   Please wear the binder 24/7.     3. Dressings:   Some OOZING and blood on the dressing is normal and to be expected. If it becomes saturated w/ BRIGHT red blood that does not stop, please call [phone] for email SONIA: [email or web address]  Please strip clean and empty the drains at least 2-3 times a day.      4. Medications:  Your medications were sent to your pharmacy.  Please take the prescribed medications as directed.   For MILD pain please take regular over the counter pain medications such as: Advil, Tylenol, Motrin.   Only take the narcotic prescribed pain medication for SEVERE PAIN.   If constipation occurs after taking narcotic pain medications, please take an over the counter stool softener.

## 2022-04-11 NOTE — ASU DISCHARGE PLAN (ADULT/PEDIATRIC) - NS MD DC FALL RISK RISK
For information on Fall & Injury Prevention, visit: [email or web address] OR  [email or web address] OR  https://www.cdc.gov/steadi/patient.html

## 2022-04-11 NOTE — ASU DISCHARGE PLAN (ADULT/PEDIATRIC) - CARE PROVIDER_API CALL
Ermelinda Sparks)  Physician Assistant Services  [address], [address]  [place] [address]  Phone: [phone]  Fax: [phone]  Scheduled Appointment: 04/19/2022 08:15 AM

## 2022-04-11 NOTE — ASU PATIENT PROFILE, ADULT - VISION (WITH CORRECTIVE LENSES IF THE PATIENT USUALLY WEARS THEM):
Normal vision: sees adequately in most situations; can see medication labels, newsprint driving  glasses/Normal vision: sees adequately in most situations; can see medication labels, newsprint

## 2022-04-11 NOTE — ASU DISCHARGE PLAN (ADULT/PEDIATRIC) - NURSING INSTRUCTIONS
Dr. Garcia's  direct phone number is [phone]. If after office hours (9-5PM M-F), then please wait until normal business hours to call.   You may leave a detailed VM as well. You may also email [email or web address] for any concerns or questions regarding scheduling appointments.  You will see Dr. Garcia's NAKUL Scott, (Ermelinda MOTA) for your post operative visit.  You may also contact her directly via email: [email or web address] for any concerns or questions.  All of discharge, safety, pain management, follow up with surgeon. Verbalized understanding of all instructions.

## 2022-04-12 ENCOUNTER — NON-APPOINTMENT (OUTPATIENT)
Age: 67
End: 2022-04-12

## 2022-04-19 ENCOUNTER — APPOINTMENT (OUTPATIENT)
Dept: PLASTIC SURGERY | Facility: CLINIC | Age: 67
End: 2022-04-19
Payer: SELF-PAY

## 2022-04-19 VITALS
BODY MASS INDEX: 38.8 KG/M2 | TEMPERATURE: 97.5 F | HEIGHT: 63 IN | HEART RATE: 102 BPM | DIASTOLIC BLOOD PRESSURE: 59 MMHG | WEIGHT: 219 LBS | OXYGEN SATURATION: 97 % | SYSTOLIC BLOOD PRESSURE: 130 MMHG

## 2022-04-19 DIAGNOSIS — Z09 ENCOUNTER FOR FOLLOW-UP EXAMINATION AFTER COMPLETED TREATMENT FOR CONDITIONS OTHER THAN MALIGNANT NEOPLASM: ICD-10-CM

## 2022-04-19 PROBLEM — F41.9 ANXIETY DISORDER, UNSPECIFIED: Chronic | Status: ACTIVE | Noted: 2022-04-06

## 2022-04-19 LAB — SURGICAL PATHOLOGY STUDY: SIGNIFICANT CHANGE UP

## 2022-04-19 PROCEDURE — 99024 POSTOP FOLLOW-UP VISIT: CPT

## 2022-04-19 RX ORDER — CEFADROXIL 500 MG/1
500 CAPSULE ORAL
Qty: 14 | Refills: 0 | Status: ACTIVE | COMMUNITY
Start: 2022-04-19 | End: 1900-01-01

## 2022-04-23 ENCOUNTER — APPOINTMENT (OUTPATIENT)
Dept: PLASTIC SURGERY | Facility: CLINIC | Age: 67
End: 2022-04-23

## 2022-04-23 VITALS
HEART RATE: 97 BPM | SYSTOLIC BLOOD PRESSURE: 129 MMHG | WEIGHT: 219 LBS | HEIGHT: 63 IN | TEMPERATURE: 97.5 F | BODY MASS INDEX: 38.8 KG/M2 | DIASTOLIC BLOOD PRESSURE: 81 MMHG | OXYGEN SATURATION: 95 %

## 2022-04-28 ENCOUNTER — APPOINTMENT (OUTPATIENT)
Dept: PLASTIC SURGERY | Facility: CLINIC | Age: 67
End: 2022-04-28

## 2022-04-28 ENCOUNTER — INPATIENT (INPATIENT)
Facility: HOSPITAL | Age: 67
LOS: 2 days | Discharge: HOME | End: 2022-05-01
Attending: INTERNAL MEDICINE | Admitting: INTERNAL MEDICINE
Payer: MEDICARE

## 2022-04-28 VITALS
SYSTOLIC BLOOD PRESSURE: 148 MMHG | HEIGHT: 63 IN | WEIGHT: 219 LBS | OXYGEN SATURATION: 91 % | HEART RATE: 120 BPM | TEMPERATURE: 97.5 F | DIASTOLIC BLOOD PRESSURE: 91 MMHG | BODY MASS INDEX: 38.8 KG/M2

## 2022-04-28 VITALS
HEART RATE: 100 BPM | OXYGEN SATURATION: 95 % | TEMPERATURE: 98 F | DIASTOLIC BLOOD PRESSURE: 82 MMHG | HEIGHT: 61 IN | SYSTOLIC BLOOD PRESSURE: 133 MMHG | RESPIRATION RATE: 16 BRPM

## 2022-04-28 DIAGNOSIS — Z90.89 ACQUIRED ABSENCE OF OTHER ORGANS: Chronic | ICD-10-CM

## 2022-04-28 DIAGNOSIS — Z98.890 OTHER SPECIFIED POSTPROCEDURAL STATES: Chronic | ICD-10-CM

## 2022-04-28 DIAGNOSIS — K43.2 INCISIONAL HERNIA WITHOUT OBSTRUCTION OR GANGRENE: Chronic | ICD-10-CM

## 2022-04-28 DIAGNOSIS — Z98.84 BARIATRIC SURGERY STATUS: Chronic | ICD-10-CM

## 2022-04-28 DIAGNOSIS — Z90.49 ACQUIRED ABSENCE OF OTHER SPECIFIED PARTS OF DIGESTIVE TRACT: Chronic | ICD-10-CM

## 2022-04-28 DIAGNOSIS — I26.99 OTHER PULMONARY EMBOLISM WITHOUT ACUTE COR PULMONALE: ICD-10-CM

## 2022-04-28 DIAGNOSIS — Z98.891 HISTORY OF UTERINE SCAR FROM PREVIOUS SURGERY: Chronic | ICD-10-CM

## 2022-04-28 LAB
ALBUMIN SERPL ELPH-MCNC: 4 G/DL — SIGNIFICANT CHANGE UP (ref 3.3–5)
ALP SERPL-CCNC: 155 U/L — HIGH (ref 40–120)
ALT FLD-CCNC: 7 U/L — SIGNIFICANT CHANGE UP (ref 4–33)
ANION GAP SERPL CALC-SCNC: 16 MMOL/L — HIGH (ref 7–14)
APTT BLD: 29.1 SEC — SIGNIFICANT CHANGE UP (ref 27–36.3)
AST SERPL-CCNC: 17 U/L — SIGNIFICANT CHANGE UP (ref 4–32)
BASE EXCESS BLDV CALC-SCNC: -1.1 MMOL/L — SIGNIFICANT CHANGE UP (ref -2–3)
BASOPHILS # BLD AUTO: 0.09 K/UL — SIGNIFICANT CHANGE UP (ref 0–0.2)
BASOPHILS NFR BLD AUTO: 0.6 % — SIGNIFICANT CHANGE UP (ref 0–2)
BILIRUB SERPL-MCNC: 1 MG/DL — SIGNIFICANT CHANGE UP (ref 0.2–1.2)
BLOOD GAS VENOUS COMPREHENSIVE RESULT: SIGNIFICANT CHANGE UP
BUN SERPL-MCNC: 19 MG/DL — SIGNIFICANT CHANGE UP (ref 7–23)
CALCIUM SERPL-MCNC: 9.2 MG/DL — SIGNIFICANT CHANGE UP (ref 8.4–10.5)
CHLORIDE BLDV-SCNC: 103 MMOL/L — SIGNIFICANT CHANGE UP (ref 96–108)
CHLORIDE SERPL-SCNC: 102 MMOL/L — SIGNIFICANT CHANGE UP (ref 98–107)
CO2 BLDV-SCNC: 24.7 MMOL/L — SIGNIFICANT CHANGE UP (ref 22–26)
CO2 SERPL-SCNC: 19 MMOL/L — LOW (ref 22–31)
CREAT SERPL-MCNC: 0.71 MG/DL — SIGNIFICANT CHANGE UP (ref 0.5–1.3)
D DIMER BLD IA.RAPID-MCNC: 1337 NG/ML DDU — HIGH
EGFR: 93 ML/MIN/1.73M2 — SIGNIFICANT CHANGE UP
EOSINOPHIL # BLD AUTO: 0 K/UL — SIGNIFICANT CHANGE UP (ref 0–0.5)
EOSINOPHIL NFR BLD AUTO: 0 % — SIGNIFICANT CHANGE UP (ref 0–6)
GAS PNL BLDV: 135 MMOL/L — LOW (ref 136–145)
GAS PNL BLDV: SIGNIFICANT CHANGE UP
GLUCOSE BLDV-MCNC: 134 MG/DL — HIGH (ref 70–99)
GLUCOSE SERPL-MCNC: 137 MG/DL — HIGH (ref 70–99)
HCO3 BLDV-SCNC: 24 MMOL/L — SIGNIFICANT CHANGE UP (ref 22–29)
HCT VFR BLD CALC: 39.8 % — SIGNIFICANT CHANGE UP (ref 34.5–45)
HCT VFR BLDA CALC: 37 % — SIGNIFICANT CHANGE UP (ref 34.5–46.5)
HGB BLD CALC-MCNC: 12.4 G/DL — SIGNIFICANT CHANGE UP (ref 11.5–15.5)
HGB BLD-MCNC: 12.4 G/DL — SIGNIFICANT CHANGE UP (ref 11.5–15.5)
IANC: 13.66 K/UL — HIGH (ref 1.8–7.4)
IMM GRANULOCYTES NFR BLD AUTO: 0.4 % — SIGNIFICANT CHANGE UP (ref 0–1.5)
INR BLD: 1.11 RATIO — SIGNIFICANT CHANGE UP (ref 0.88–1.16)
LACTATE BLDV-MCNC: 1.8 MMOL/L — SIGNIFICANT CHANGE UP (ref 0.5–2)
LYMPHOCYTES # BLD AUTO: 1.67 K/UL — SIGNIFICANT CHANGE UP (ref 1–3.3)
LYMPHOCYTES # BLD AUTO: 10.3 % — LOW (ref 13–44)
MAGNESIUM SERPL-MCNC: 2.3 MG/DL — SIGNIFICANT CHANGE UP (ref 1.6–2.6)
MCHC RBC-ENTMCNC: 27.6 PG — SIGNIFICANT CHANGE UP (ref 27–34)
MCHC RBC-ENTMCNC: 31.2 GM/DL — LOW (ref 32–36)
MCV RBC AUTO: 88.4 FL — SIGNIFICANT CHANGE UP (ref 80–100)
MONOCYTES # BLD AUTO: 0.8 K/UL — SIGNIFICANT CHANGE UP (ref 0–0.9)
MONOCYTES NFR BLD AUTO: 4.9 % — SIGNIFICANT CHANGE UP (ref 2–14)
NEUTROPHILS # BLD AUTO: 13.66 K/UL — HIGH (ref 1.8–7.4)
NEUTROPHILS NFR BLD AUTO: 83.8 % — HIGH (ref 43–77)
NRBC # BLD: 0 /100 WBCS — SIGNIFICANT CHANGE UP
NRBC # FLD: 0 K/UL — SIGNIFICANT CHANGE UP
NT-PROBNP SERPL-SCNC: 2346 PG/ML — HIGH
PCO2 BLDV: 38 MMHG — LOW (ref 39–42)
PH BLDV: 7.4 — SIGNIFICANT CHANGE UP (ref 7.32–7.43)
PLATELET # BLD AUTO: 574 K/UL — HIGH (ref 150–400)
PO2 BLDV: <20 MMHG — SIGNIFICANT CHANGE UP
POTASSIUM BLDV-SCNC: 4.3 MMOL/L — SIGNIFICANT CHANGE UP (ref 3.5–5.1)
POTASSIUM SERPL-MCNC: 4.3 MMOL/L — SIGNIFICANT CHANGE UP (ref 3.5–5.3)
POTASSIUM SERPL-SCNC: 4.3 MMOL/L — SIGNIFICANT CHANGE UP (ref 3.5–5.3)
PROT SERPL-MCNC: 7.8 G/DL — SIGNIFICANT CHANGE UP (ref 6–8.3)
PROTHROM AB SERPL-ACNC: 12.9 SEC — SIGNIFICANT CHANGE UP (ref 10.5–13.4)
RBC # BLD: 4.5 M/UL — SIGNIFICANT CHANGE UP (ref 3.8–5.2)
RBC # FLD: 14.4 % — SIGNIFICANT CHANGE UP (ref 10.3–14.5)
SAO2 % BLDV: 25.5 % — SIGNIFICANT CHANGE UP
SARS-COV-2 RNA SPEC QL NAA+PROBE: SIGNIFICANT CHANGE UP
SODIUM SERPL-SCNC: 137 MMOL/L — SIGNIFICANT CHANGE UP (ref 135–145)
TROPONIN T, HIGH SENSITIVITY RESULT: 22 NG/L — SIGNIFICANT CHANGE UP
TROPONIN T, HIGH SENSITIVITY RESULT: 23 NG/L — SIGNIFICANT CHANGE UP
WBC # BLD: 16.29 K/UL — HIGH (ref 3.8–10.5)
WBC # FLD AUTO: 16.29 K/UL — HIGH (ref 3.8–10.5)

## 2022-04-28 PROCEDURE — 93010 ELECTROCARDIOGRAM REPORT: CPT

## 2022-04-28 PROCEDURE — 99291 CRITICAL CARE FIRST HOUR: CPT | Mod: 25

## 2022-04-28 PROCEDURE — 71275 CT ANGIOGRAPHY CHEST: CPT | Mod: 26,MA

## 2022-04-28 RX ORDER — HEPARIN SODIUM 5000 [USP'U]/ML
4000 INJECTION INTRAVENOUS; SUBCUTANEOUS EVERY 6 HOURS
Refills: 0 | Status: DISCONTINUED | OUTPATIENT
Start: 2022-04-28 | End: 2022-04-29

## 2022-04-28 RX ORDER — HEPARIN SODIUM 5000 [USP'U]/ML
8000 INJECTION INTRAVENOUS; SUBCUTANEOUS EVERY 6 HOURS
Refills: 0 | Status: DISCONTINUED | OUTPATIENT
Start: 2022-04-28 | End: 2022-04-29

## 2022-04-28 RX ORDER — HEPARIN SODIUM 5000 [USP'U]/ML
8000 INJECTION INTRAVENOUS; SUBCUTANEOUS ONCE
Refills: 0 | Status: COMPLETED | OUTPATIENT
Start: 2022-04-28 | End: 2022-04-28

## 2022-04-28 RX ORDER — HEPARIN SODIUM 5000 [USP'U]/ML
INJECTION INTRAVENOUS; SUBCUTANEOUS
Qty: 25000 | Refills: 0 | Status: DISCONTINUED | OUTPATIENT
Start: 2022-04-28 | End: 2022-04-29

## 2022-04-28 RX ADMIN — HEPARIN SODIUM 8000 UNIT(S): 5000 INJECTION INTRAVENOUS; SUBCUTANEOUS at 22:37

## 2022-04-28 RX ADMIN — HEPARIN SODIUM 1800 UNIT(S)/HR: 5000 INJECTION INTRAVENOUS; SUBCUTANEOUS at 22:37

## 2022-04-28 NOTE — ED ADULT NURSE REASSESSMENT NOTE - NS ED NURSE REASSESS COMMENT FT1
Report given to ESSU 2 RN Landon. pt. in NAD at this time. placed on 2L O2 Via NC for comfort. respirations even and unlabored. Heparin running without complications.

## 2022-04-28 NOTE — ED PROVIDER NOTE - PROGRESS NOTE DETAILS
Christopher, PGY3: pt with bilat PE on CT, R heart strain, elevated BNP, spoke with plastics they're ok with starting AC's, will do heparin given recent procedure, pt tba tele

## 2022-04-28 NOTE — ED PROVIDER NOTE - OBJECTIVE STATEMENT
67 y/o F  HLD, GERD and morbid obesity BMI 43 s/p Lipodystrophy 4/11 p/w sob x 4 days. pt states she has been having worsening dyspnea on excretion, states she cannot walk a few feet without feeling sob. went to surgeon office today for follow up Dr. Garcia who recommended to go to ed.   pt denies any fever, chills, cp, palpitations, abdominal pain, v/d, dysuria, back pain, dizziness, hx of dvt/pe, leg swelling

## 2022-04-28 NOTE — ED ADULT NURSE REASSESSMENT NOTE - NS ED NURSE REASSESS COMMENT FT1
Report received from MODESTO Hicks. pt. remains A&Ox4, awake and resting. pt. reports improvement in COVARRUBIAS. endorses no SOB at this time. no acute distress noted. respirations even and unlabored. VSS. 20g IV placed in the R AC. Labs drawn and sent. pending due meds.

## 2022-04-28 NOTE — CONSULT NOTE ADULT - SUBJECTIVE AND OBJECTIVE BOX
HPI:  66F s/p b/l lateral thigh lift 22 at [facility], presented to Dr aGrcia's office today for follow up c/o SOB since .  She was told to present to the ED for further evaluation.    PMH significant for morbid obesity, HLD, anxiety.      She denies calf pain, LE edema, fever, history of dyspnea.      PMH  Hypercholesterolemia    Acid reflux    Anxiety      PSH  H/O laparoscopic adjustable gastric banding    S/P cholecystectomy    S/P hernia repair    S/P     S/P tonsillectomy    S/P left knee arthroscopy    S/P LASIK surgery of both eyes    H/O left breast biopsy    Incisional hernia      MEDS    Allergies    No Known Allergies    Intolerances        Social        Physical Exam  T(C): 36.8 (22 @ 16:00), Max: 36.8 (22 @ 16:00)  HR: 100 (22 @ 16:00) (100 - 100)  BP: 133/82 (22 @ 16:00) (133/82 - 133/82)  RR: 16 (22 @ 16:00) (16 - 16)  SpO2: 95% (22 @ 16:00) (95% - 95%)  Wt(kg): --  Tmax: T(C): , Max: 36.8 (22 @ 16:00)  Wt(kg): --    Gen: NAD  Speaking in fluent sentences with some shortness of breath.  Right thigh drain SS, ~10cc in bulb  BL LE without edema, not TTP, negative homans sign    Labs:        Pending            Imaging HPI:  66F s/p b/l lateral thigh lift 22 at [facility], presented to Dr Garcia's office today for follow up c/o SOB since .  She was told to present to the ED for further evaluation.    PMH significant for morbid obesity, HLD, anxiety.      She denies calf pain, LE edema, fever, history of dyspnea.      PMH  Hypercholesterolemia    Acid reflux    Anxiety      PSH  H/O laparoscopic adjustable gastric banding    S/P cholecystectomy    S/P hernia repair    S/P     S/P tonsillectomy    S/P left knee arthroscopy    S/P LASIK surgery of both eyes    H/O left breast biopsy    Incisional hernia      MEDS    Allergies    No Known Allergies    Intolerances        Social        Physical Exam  T(C): 36.8 (22 @ 16:00), Max: 36.8 (22 @ 16:00)  HR: 100 (22 @ 16:00) (100 - 100)  BP: 133/82 (22 @ 16:00) (133/82 - 133/82)  RR: 16 (22 @ 16:00) (16 - 16)  SpO2: 95% (22 @ 16:00) (95% - 95%)  Wt(kg): --  Tmax: T(C): , Max: 36.8 (22 @ 16:00)  Wt(kg): --    Gen: NAD  Speaking in fluent sentences with some shortness of breath.  Right thigh drain SS, ~10cc in bulb  BL LE without edema, not TTP, negative homans sign    Labs:        Pending            Imaging    Pending

## 2022-04-28 NOTE — ED PROVIDER NOTE - CLINICAL SUMMARY MEDICAL DECISION MAKING FREE TEXT BOX
ddx likely  pending EKGs/CXR/cardiac monitor/labs  2) reassess  The CXR assists in r/o PNA, Ptx & esophageal tears.  The pt doesn't appear to have a PE based on the Wells Score & there is no apparent DVT.  There are no signs of pericarditis, endocarditis, or myocarditis based on hx, risk factor analysis & the ED EKG.  There is no fever.  There also doesn't appear to be an aortic dissection based on hx, exam, and signs. ddx chf, pe, pleural effusions, uri. pending EKGs/CXR/cardiac monitor/labs/CTA 2) reassess  The CXR assists in r/o PNA, Ptx & esophageal tears.  Wells score 3.  There are no signs of pericarditis, endocarditis, or myocarditis based on hx, risk factor analysis & the ED EKG.  There is no fever.  There also doesn't appear to be an aortic dissection based on hx, exam, and signs.

## 2022-04-28 NOTE — CONSULT NOTE ADULT - ASSESSMENT
66F s/p bl lateral thigh lift now p/w SOB, concern for PE  - CT PA  - Will follow up results.    Lilia Chaves PGY6  [facility] [phone]  [facility] [phone]  J [id number]  66F s/p bl lateral thigh lift now p/w SOB, concern for PE  - CT PA  - Will follow up results.  - FREDDIE drain removed    Lilia Chaves PGY6  [facility] [phone]  [facility] [phone]  [place] [id number]  66F s/p bl lateral thigh lift now p/w SOB, concern for PE  - CT PA  - Will follow up results.  - FREDDIE drain removed    Addendum 4/28/22 2042  Patient found to have acute PE with evidence of right heart strain.  No contraindication for heparin gtt from a surgical perspective. Will follow patient while in house        Lilia Chaves PGY6  [facility] [phone]  [facility] [phone]  [facility] [id number]

## 2022-04-28 NOTE — ED PROVIDER NOTE - ATTENDING CONTRIBUTION TO CARE
Brief HPI:  65 y/o F  HLD, GERD, s/p Lipodystrophy 4/11 p/w sob x 4 days.  Patient state sob exertional and at rest.  Denies cp, fever, cough, hemoptysis.  Reports thighs swollen since procedure.  Had drain in right sided lipodystrophy procedure site.  Non-smoker. No history of dvt/pe.     Vitals:   Reviewed    Exam:    GEN:  Non-toxic appearing, non-distressed, speaking full sentences, non-diaphoretic, AAOx3  HEENT:  NCAT, neck supple, EOMI, PERRLA, sclera anicteric, no conjunctival pallor or injection, no stridor, normal voice, no tonsillar exudate, uvula midline  CV:  regular rhythm and rate, s1/s2 audible, no murmurs, rubs or gallops, peripheral pulses 2+ and symmetric  PULM:  non-labored respirations, lungs clear to auscultation bilaterally, no wheezes, crackles or rales  ABD:  non distended, non-tender, no rebound, no guarding, negative Meadows's sign, bowel sounds normal, no cvat  MSK:  no gross deformity, non-tender extremities and joints, range of motion grossly normal appearing, no extremity edema, extremities warm and well perfused   NEURO:  AAOx3, CN II-XII intact, motor 5/5 in upper and lower extremities bilaterally, sensation grossly intact in extremities and trunk, finger to nose testing wnl, no nystagmus, negative Romberg, no pronator drift, no gait deficit  SKIN: drain in place in right sided thigh procedure area     A/P:   65 y/o F  HLD, GERD, s/p Lipodystrophy 4/11 p/w sob x 4 days.  VSS.  Appears euvolemic.  EKG non-ischemic.  PE vs. infectious vs. heart failure considered.  Plan for labs, dimer, cxr.  Dispo pending.

## 2022-04-28 NOTE — ED PROVIDER NOTE - NS ED ROS FT
Constitutional: No fever, chills.  Eyes:  No visual changes  ENMT:  No neck pain  Cardiac:  No chest pain  Respiratory:  No cough, +SOB  GI:  No nausea, vomiting, diarrhea, abdominal pain.  :  No dysuria, hematuria  MS:  No back pain.  Neuro:  No headache or lightheadedness  Skin:  No skin rash

## 2022-04-29 DIAGNOSIS — R09.89 OTHER SPECIFIED SYMPTOMS AND SIGNS INVOLVING THE CIRCULATORY AND RESPIRATORY SYSTEMS: ICD-10-CM

## 2022-04-29 DIAGNOSIS — M79.89 OTHER SPECIFIED SOFT TISSUE DISORDERS: ICD-10-CM

## 2022-04-29 DIAGNOSIS — I26.99 OTHER PULMONARY EMBOLISM WITHOUT ACUTE COR PULMONALE: ICD-10-CM

## 2022-04-29 DIAGNOSIS — F41.9 ANXIETY DISORDER, UNSPECIFIED: ICD-10-CM

## 2022-04-29 DIAGNOSIS — E78.5 HYPERLIPIDEMIA, UNSPECIFIED: ICD-10-CM

## 2022-04-29 DIAGNOSIS — D72.829 ELEVATED WHITE BLOOD CELL COUNT, UNSPECIFIED: ICD-10-CM

## 2022-04-29 DIAGNOSIS — K21.9 GASTRO-ESOPHAGEAL REFLUX DISEASE WITHOUT ESOPHAGITIS: ICD-10-CM

## 2022-04-29 DIAGNOSIS — Z98.890 OTHER SPECIFIED POSTPROCEDURAL STATES: ICD-10-CM

## 2022-04-29 DIAGNOSIS — Z29.9 ENCOUNTER FOR PROPHYLACTIC MEASURES, UNSPECIFIED: ICD-10-CM

## 2022-04-29 LAB
24R-OH-CALCIDIOL SERPL-MCNC: 13.9 NG/ML — LOW (ref 30–80)
24R-OH-CALCIDIOL SERPL-MCNC: 17.1 NG/ML — LOW (ref 30–80)
A1C WITH ESTIMATED AVERAGE GLUCOSE RESULT: 5.1 % — SIGNIFICANT CHANGE UP (ref 4–5.6)
A1C WITH ESTIMATED AVERAGE GLUCOSE RESULT: 5.2 % — SIGNIFICANT CHANGE UP (ref 4–5.6)
ANION GAP SERPL CALC-SCNC: 15 MMOL/L — HIGH (ref 7–14)
ANION GAP SERPL CALC-SCNC: 15 MMOL/L — HIGH (ref 7–14)
APPEARANCE UR: ABNORMAL
APTT BLD: 183.1 SEC — CRITICAL HIGH (ref 27–36.3)
APTT BLD: 58.7 SEC — HIGH (ref 27–36.3)
APTT BLD: 62.3 SEC — HIGH (ref 27–36.3)
BACTERIA # UR AUTO: ABNORMAL
BASOPHILS # BLD AUTO: 0.11 K/UL — SIGNIFICANT CHANGE UP (ref 0–0.2)
BASOPHILS NFR BLD AUTO: 0.7 % — SIGNIFICANT CHANGE UP (ref 0–2)
BILIRUB UR-MCNC: NEGATIVE — SIGNIFICANT CHANGE UP
BUN SERPL-MCNC: 18 MG/DL — SIGNIFICANT CHANGE UP (ref 7–23)
BUN SERPL-MCNC: 19 MG/DL — SIGNIFICANT CHANGE UP (ref 7–23)
CALCIUM SERPL-MCNC: 8.9 MG/DL — SIGNIFICANT CHANGE UP (ref 8.4–10.5)
CALCIUM SERPL-MCNC: 9.2 MG/DL — SIGNIFICANT CHANGE UP (ref 8.4–10.5)
CHLORIDE SERPL-SCNC: 101 MMOL/L — SIGNIFICANT CHANGE UP (ref 98–107)
CHLORIDE SERPL-SCNC: 99 MMOL/L — SIGNIFICANT CHANGE UP (ref 98–107)
CHOLEST SERPL-MCNC: 132 MG/DL — SIGNIFICANT CHANGE UP
CHOLEST SERPL-MCNC: 141 MG/DL — SIGNIFICANT CHANGE UP
CO2 SERPL-SCNC: 18 MMOL/L — LOW (ref 22–31)
CO2 SERPL-SCNC: 20 MMOL/L — LOW (ref 22–31)
COLOR SPEC: YELLOW — SIGNIFICANT CHANGE UP
CREAT SERPL-MCNC: 0.79 MG/DL — SIGNIFICANT CHANGE UP (ref 0.5–1.3)
CREAT SERPL-MCNC: 0.79 MG/DL — SIGNIFICANT CHANGE UP (ref 0.5–1.3)
DIFF PNL FLD: NEGATIVE — SIGNIFICANT CHANGE UP
EGFR: 82 ML/MIN/1.73M2 — SIGNIFICANT CHANGE UP
EGFR: 82 ML/MIN/1.73M2 — SIGNIFICANT CHANGE UP
EOSINOPHIL # BLD AUTO: 0.04 K/UL — SIGNIFICANT CHANGE UP (ref 0–0.5)
EOSINOPHIL NFR BLD AUTO: 0.3 % — SIGNIFICANT CHANGE UP (ref 0–6)
EPI CELLS # UR: 5 /HPF — SIGNIFICANT CHANGE UP (ref 0–5)
ESTIMATED AVERAGE GLUCOSE: 100 — SIGNIFICANT CHANGE UP
ESTIMATED AVERAGE GLUCOSE: 103 — SIGNIFICANT CHANGE UP
GLUCOSE SERPL-MCNC: 115 MG/DL — HIGH (ref 70–99)
GLUCOSE SERPL-MCNC: 119 MG/DL — HIGH (ref 70–99)
GLUCOSE UR QL: NEGATIVE — SIGNIFICANT CHANGE UP
HCT VFR BLD CALC: 34.4 % — LOW (ref 34.5–45)
HCT VFR BLD CALC: 35.5 % — SIGNIFICANT CHANGE UP (ref 34.5–45)
HCT VFR BLD CALC: 36.5 % — SIGNIFICANT CHANGE UP (ref 34.5–45)
HCV AB S/CO SERPL IA: 0.11 S/CO — SIGNIFICANT CHANGE UP (ref 0–0.99)
HCV AB SERPL-IMP: SIGNIFICANT CHANGE UP
HDLC SERPL-MCNC: 40 MG/DL — LOW
HDLC SERPL-MCNC: 46 MG/DL — LOW
HGB BLD-MCNC: 10.9 G/DL — LOW (ref 11.5–15.5)
HGB BLD-MCNC: 11.1 G/DL — LOW (ref 11.5–15.5)
HGB BLD-MCNC: 11.5 G/DL — SIGNIFICANT CHANGE UP (ref 11.5–15.5)
IANC: 10.18 K/UL — HIGH (ref 1.8–7.4)
IMM GRANULOCYTES NFR BLD AUTO: 0.3 % — SIGNIFICANT CHANGE UP (ref 0–1.5)
KETONES UR-MCNC: NEGATIVE — SIGNIFICANT CHANGE UP
LEUKOCYTE ESTERASE UR-ACNC: ABNORMAL
LIPID PNL WITH DIRECT LDL SERPL: 67 MG/DL — SIGNIFICANT CHANGE UP
LIPID PNL WITH DIRECT LDL SERPL: 71 MG/DL — SIGNIFICANT CHANGE UP
LYMPHOCYTES # BLD AUTO: 25.6 % — SIGNIFICANT CHANGE UP (ref 13–44)
LYMPHOCYTES # BLD AUTO: 4 K/UL — HIGH (ref 1–3.3)
MAGNESIUM SERPL-MCNC: 2.3 MG/DL — SIGNIFICANT CHANGE UP (ref 1.6–2.6)
MCHC RBC-ENTMCNC: 27.4 PG — SIGNIFICANT CHANGE UP (ref 27–34)
MCHC RBC-ENTMCNC: 27.9 PG — SIGNIFICANT CHANGE UP (ref 27–34)
MCHC RBC-ENTMCNC: 27.9 PG — SIGNIFICANT CHANGE UP (ref 27–34)
MCHC RBC-ENTMCNC: 31.3 GM/DL — LOW (ref 32–36)
MCHC RBC-ENTMCNC: 31.5 GM/DL — LOW (ref 32–36)
MCHC RBC-ENTMCNC: 31.7 GM/DL — LOW (ref 32–36)
MCV RBC AUTO: 87.7 FL — SIGNIFICANT CHANGE UP (ref 80–100)
MCV RBC AUTO: 88.2 FL — SIGNIFICANT CHANGE UP (ref 80–100)
MCV RBC AUTO: 88.6 FL — SIGNIFICANT CHANGE UP (ref 80–100)
MONOCYTES # BLD AUTO: 1.23 K/UL — HIGH (ref 0–0.9)
MONOCYTES NFR BLD AUTO: 7.9 % — SIGNIFICANT CHANGE UP (ref 2–14)
NEUTROPHILS # BLD AUTO: 10.18 K/UL — HIGH (ref 1.8–7.4)
NEUTROPHILS NFR BLD AUTO: 65.2 % — SIGNIFICANT CHANGE UP (ref 43–77)
NITRITE UR-MCNC: NEGATIVE — SIGNIFICANT CHANGE UP
NON HDL CHOLESTEROL: 92 MG/DL — SIGNIFICANT CHANGE UP
NON HDL CHOLESTEROL: 95 MG/DL — SIGNIFICANT CHANGE UP
NRBC # BLD: 0 /100 WBCS — SIGNIFICANT CHANGE UP
NRBC # FLD: 0 K/UL — SIGNIFICANT CHANGE UP
PH UR: 6 — SIGNIFICANT CHANGE UP (ref 5–8)
PHOSPHATE SERPL-MCNC: 3.3 MG/DL — SIGNIFICANT CHANGE UP (ref 2.5–4.5)
PLATELET # BLD AUTO: 498 K/UL — HIGH (ref 150–400)
PLATELET # BLD AUTO: 520 K/UL — HIGH (ref 150–400)
PLATELET # BLD AUTO: 525 K/UL — HIGH (ref 150–400)
POTASSIUM SERPL-MCNC: 3.6 MMOL/L — SIGNIFICANT CHANGE UP (ref 3.5–5.3)
POTASSIUM SERPL-MCNC: 3.9 MMOL/L — SIGNIFICANT CHANGE UP (ref 3.5–5.3)
POTASSIUM SERPL-SCNC: 3.6 MMOL/L — SIGNIFICANT CHANGE UP (ref 3.5–5.3)
POTASSIUM SERPL-SCNC: 3.9 MMOL/L — SIGNIFICANT CHANGE UP (ref 3.5–5.3)
PROT UR-MCNC: ABNORMAL
RBC # BLD: 3.9 M/UL — SIGNIFICANT CHANGE UP (ref 3.8–5.2)
RBC # BLD: 4.05 M/UL — SIGNIFICANT CHANGE UP (ref 3.8–5.2)
RBC # BLD: 4.12 M/UL — SIGNIFICANT CHANGE UP (ref 3.8–5.2)
RBC # FLD: 14.6 % — HIGH (ref 10.3–14.5)
RBC CASTS # UR COMP ASSIST: 2 /HPF — SIGNIFICANT CHANGE UP (ref 0–4)
SODIUM SERPL-SCNC: 132 MMOL/L — LOW (ref 135–145)
SODIUM SERPL-SCNC: 136 MMOL/L — SIGNIFICANT CHANGE UP (ref 135–145)
SP GR SPEC: 1.04 — SIGNIFICANT CHANGE UP (ref 1–1.05)
TRIGL SERPL-MCNC: 119 MG/DL — SIGNIFICANT CHANGE UP
TRIGL SERPL-MCNC: 127 MG/DL — SIGNIFICANT CHANGE UP
TSH SERPL-MCNC: 4.46 UIU/ML — HIGH (ref 0.27–4.2)
TSH SERPL-MCNC: 5.03 UIU/ML — HIGH (ref 0.27–4.2)
UROBILINOGEN FLD QL: SIGNIFICANT CHANGE UP
WBC # BLD: 14.17 K/UL — HIGH (ref 3.8–10.5)
WBC # BLD: 14.77 K/UL — HIGH (ref 3.8–10.5)
WBC # BLD: 15.6 K/UL — HIGH (ref 3.8–10.5)
WBC # FLD AUTO: 14.17 K/UL — HIGH (ref 3.8–10.5)
WBC # FLD AUTO: 14.77 K/UL — HIGH (ref 3.8–10.5)
WBC # FLD AUTO: 15.6 K/UL — HIGH (ref 3.8–10.5)
WBC UR QL: 12 /HPF — HIGH (ref 0–5)

## 2022-04-29 PROCEDURE — 99223 1ST HOSP IP/OBS HIGH 75: CPT

## 2022-04-29 PROCEDURE — 99024 POSTOP FOLLOW-UP VISIT: CPT

## 2022-04-29 PROCEDURE — 93306 TTE W/DOPPLER COMPLETE: CPT | Mod: 26

## 2022-04-29 RX ORDER — HEPARIN SODIUM 5000 [USP'U]/ML
8000 INJECTION INTRAVENOUS; SUBCUTANEOUS EVERY 6 HOURS
Refills: 0 | Status: DISCONTINUED | OUTPATIENT
Start: 2022-04-29 | End: 2022-04-30

## 2022-04-29 RX ORDER — HEPARIN SODIUM 5000 [USP'U]/ML
4000 INJECTION INTRAVENOUS; SUBCUTANEOUS EVERY 6 HOURS
Refills: 0 | Status: DISCONTINUED | OUTPATIENT
Start: 2022-04-29 | End: 2022-04-29

## 2022-04-29 RX ORDER — HEPARIN SODIUM 5000 [USP'U]/ML
1500 INJECTION INTRAVENOUS; SUBCUTANEOUS
Qty: 25000 | Refills: 0 | Status: DISCONTINUED | OUTPATIENT
Start: 2022-04-29 | End: 2022-04-30

## 2022-04-29 RX ORDER — HEPARIN SODIUM 5000 [USP'U]/ML
INJECTION INTRAVENOUS; SUBCUTANEOUS
Qty: 25000 | Refills: 0 | Status: DISCONTINUED | OUTPATIENT
Start: 2022-04-29 | End: 2022-04-29

## 2022-04-29 RX ORDER — HEPARIN SODIUM 5000 [USP'U]/ML
4000 INJECTION INTRAVENOUS; SUBCUTANEOUS EVERY 6 HOURS
Refills: 0 | Status: DISCONTINUED | OUTPATIENT
Start: 2022-04-29 | End: 2022-04-30

## 2022-04-29 RX ORDER — DIPHENHYDRAMINE HCL 50 MG
2 CAPSULE ORAL
Qty: 0 | Refills: 0 | DISCHARGE

## 2022-04-29 RX ORDER — HEPARIN SODIUM 5000 [USP'U]/ML
8000 INJECTION INTRAVENOUS; SUBCUTANEOUS EVERY 6 HOURS
Refills: 0 | Status: DISCONTINUED | OUTPATIENT
Start: 2022-04-29 | End: 2022-04-29

## 2022-04-29 RX ORDER — CEFTRIAXONE 500 MG/1
1000 INJECTION, POWDER, FOR SOLUTION INTRAMUSCULAR; INTRAVENOUS EVERY 24 HOURS
Refills: 0 | Status: DISCONTINUED | OUTPATIENT
Start: 2022-04-29 | End: 2022-05-01

## 2022-04-29 RX ORDER — PANTOPRAZOLE SODIUM 20 MG/1
1 TABLET, DELAYED RELEASE ORAL
Qty: 0 | Refills: 0 | DISCHARGE

## 2022-04-29 RX ORDER — IBUPROFEN 200 MG
2 TABLET ORAL
Qty: 0 | Refills: 0 | DISCHARGE

## 2022-04-29 RX ORDER — SODIUM CHLORIDE 9 MG/ML
3 INJECTION INTRAMUSCULAR; INTRAVENOUS; SUBCUTANEOUS EVERY 8 HOURS
Refills: 0 | Status: DISCONTINUED | OUTPATIENT
Start: 2022-04-29 | End: 2022-05-01

## 2022-04-29 RX ORDER — FLUOXETINE HCL 10 MG
1 CAPSULE ORAL
Qty: 0 | Refills: 0 | DISCHARGE

## 2022-04-29 RX ORDER — ATORVASTATIN CALCIUM 80 MG/1
1 TABLET, FILM COATED ORAL
Qty: 0 | Refills: 0 | DISCHARGE

## 2022-04-29 RX ADMIN — SODIUM CHLORIDE 3 MILLILITER(S): 9 INJECTION INTRAMUSCULAR; INTRAVENOUS; SUBCUTANEOUS at 13:00

## 2022-04-29 RX ADMIN — HEPARIN SODIUM 1500 UNIT(S)/HR: 5000 INJECTION INTRAVENOUS; SUBCUTANEOUS at 21:25

## 2022-04-29 RX ADMIN — HEPARIN SODIUM 0 UNIT(S)/HR: 5000 INJECTION INTRAVENOUS; SUBCUTANEOUS at 05:21

## 2022-04-29 RX ADMIN — SODIUM CHLORIDE 3 MILLILITER(S): 9 INJECTION INTRAMUSCULAR; INTRAVENOUS; SUBCUTANEOUS at 21:28

## 2022-04-29 RX ADMIN — HEPARIN SODIUM 1500 UNIT(S)/HR: 5000 INJECTION INTRAVENOUS; SUBCUTANEOUS at 07:58

## 2022-04-29 RX ADMIN — HEPARIN SODIUM 1800 UNIT(S)/HR: 5000 INJECTION INTRAVENOUS; SUBCUTANEOUS at 04:31

## 2022-04-29 RX ADMIN — HEPARIN SODIUM 1500 UNIT(S)/HR: 5000 INJECTION INTRAVENOUS; SUBCUTANEOUS at 07:56

## 2022-04-29 RX ADMIN — CEFTRIAXONE 100 MILLIGRAM(S): 500 INJECTION, POWDER, FOR SOLUTION INTRAMUSCULAR; INTRAVENOUS at 19:51

## 2022-04-29 RX ADMIN — SODIUM CHLORIDE 3 MILLILITER(S): 9 INJECTION INTRAMUSCULAR; INTRAVENOUS; SUBCUTANEOUS at 05:27

## 2022-04-29 RX ADMIN — HEPARIN SODIUM 1500 UNIT(S)/HR: 5000 INJECTION INTRAVENOUS; SUBCUTANEOUS at 19:35

## 2022-04-29 RX ADMIN — HEPARIN SODIUM 1500 UNIT(S)/HR: 5000 INJECTION INTRAVENOUS; SUBCUTANEOUS at 14:51

## 2022-04-29 NOTE — H&P ADULT - HISTORY OF PRESENT ILLNESS
66F, self ambulating, obese, history of anxiety, Hyperlipidemia, GERD s/p b/l lateral thigh lift 4/11/22 at [facility], had L side drain removed 4/20. On 4/25 began experiencing COVARRUBIAS after ambulating 5 steps and exertional, fast palpitations that last for 1 minute and subsided with rest. The pt went for f/u visit to have her R thigh drain removed. She told the doctor of her COVARRUBIAS and palpitations, he checked her SPO2 =95%, EMNS was called and the pt was taken to the ED. Denies HA, dizziness, falls, blurry vision, SOB, chest pain, nausea, vomit, diarrhea, constipation, abdominal pain, dysuria, chills, diaphoresis.    In the Ed, CTPA: Acute bilateral pulmonary emboli with suggestion of right heart strain and infarcts in the right and left lower lobes. The pt placed on a heparin drip.   WBC = 16.29   ProBNP=  2346  TROP 23-> 22.    Vitals: T Max: 98.1* oral, HR = 95b/ min, BP = 128/ 62, RR= 18b/ min, SPO2= 100% ra    66F, self ambulating, obese, history of anxiety, Hyperlipidemia, GERD s/p b/l lateral thigh lift 4/11/22 at [facility], had L side drain removed 4/20. On 4/25 began experiencing COVARRUBIAS after ambulating 5 steps and exertional, fast palpitations that last for 1 minute and subsided with rest. The pt went for f/u visit to have her R thigh drain removed. She told the doctor of her COVARRUBIAS and palpitations, he checked her SPO2 =95%, EMS was called and the pt was taken to the ED. Pt denies hx of prior DVT/PE, malignancy, hemoptysis. After her surgery she remained ambulatory. She did have a recent flight from [place] several weeks ago. She has hx of varicose veins but never DVT. She believes her sister may have hx of DVT but denies FHx of clotting disorders. Denies HA, dizziness, falls, blurry vision, SOB, chest pain, nausea, vomit, diarrhea, constipation, abdominal pain, dysuria, chills, diaphoresis.    In the Ed, CTPA: Acute bilateral pulmonary emboli with suggestion of right heart strain and infarcts in the right and left lower lobes. The pt placed on a heparin drip.   WBC = 16.29   ProBNP=  2346  TROP 23-> 22.    Vitals: T Max: 98.1* oral, HR = 95b/ min, BP = 128/ 62, RR= 18b/ min, SPO2= 100% ra

## 2022-04-29 NOTE — PHYSICAL THERAPY INITIAL EVALUATION ADULT - PERTINENT HX OF CURRENT PROBLEM, REHAB EVAL
This is a 66F, obese history of anxiety, s/p b/l lateral thigh lift 4/11/22, admitted for COVARRUBIAS found to have b/l P.E. with R heart strain.

## 2022-04-29 NOTE — H&P ADULT - NS ATTEND AMEND GEN_ALL_CORE FT
66F, obese history of anxiety, Hyperlipidemia, GERD s/p b/l lateral thigh lift 4/11/22, admitted for COVARRUBIAS found to have b/l P.E. with R heart strain.     #Acute pulm embolism  -Evaluated by CCU - not a candidate  -Pt is HD stable, tachycardia improved, BP normal  -C/w heparin gtt  -Echo ordered  -Monitor on tele  -Wean O2 as tolerated  -confirm DOAC coverage with insurance  -heme eval as outpatient  -dopplers    #Leukocytosis  Likely in setting of PE and lung infarcts. No infectious sx and no fever. Monitor off abx    #Anxiety  Fluoxetine    #HLD  Statin    #GERD  PPI    #S/p thigh lift  No signs of infection. per plastics, no contraindication to AC. F/u plastics for suture removal 66F, obese history of anxiety, Hyperlipidemia, GERD s/p b/l lateral thigh lift 4/11/22, admitted for COVARRUBIAS found to have b/l P.E. with R heart strain.     #Acute pulm embolism  Right heart strain on CT. HD stable. Symptoms improved. No CP, no LH, no LOC.  -Evaluated by CCU - not a candidate. F/u official note  -Pt is HD stable, tachycardia improved, BP normal  -C/w heparin gtt  -Echo ordered  -Monitor on tele  -Wean O2 as tolerated  -confirm DOAC coverage with insurance  -heme eval as outpatient  -dopplers    #Leukocytosis  Likely in setting of PE and lung infarcts. No infectious sx and no fever. Monitor off abx    #Anxiety  Fluoxetine    #HLD  Statin    #GERD  PPI    #S/p thigh lift  No signs of infection. per plastics, no contraindication to AC. F/u plastics for suture removal

## 2022-04-29 NOTE — H&P ADULT - MUSCULOSKELETAL
details… detailed exam no joint swelling/no joint erythema/no joint warmth/no calf tenderness/normal strength no

## 2022-04-29 NOTE — H&P ADULT - NSHPLABSRESULTS_GEN_ALL_CORE
CTPA: Acute bilateral pulmonary emboli with suggestion of right heart strain and infarcts in the right and left lower lobes.    WBC= 16.29  D Dimer= 1337.  vLactate= 1.8  vPH= 7.4  COVID PCR : Not detected  ProBNP 2346  TROP 23-> 22    EKG NSR @ 90b/ min, LVH QT/ QTC= 324/ 396                          12.4   16.29 )-----------( 574      ( 28 Apr 2022 18:17 )             39.8   04-28    137  |  102  |  19  ----------------------------<  137<H>  4.3   |  19<L>  |  0.71    Ca    9.2      28 Apr 2022 18:17  Mg     2.30     04-28    TPro  7.8  /  Alb  4.0  /  TBili  1.0  /  DBili  x   /  AST  17  /  ALT  7   /  AlkPhos  155<H>  04-28 Labs reviewed below:  WBC= 16.29  D Dimer= 1337.  vLactate= 1.8  vPH= 7.4  COVID PCR : Not detected  ProBNP 2346  TROP 23-> 22                          12.4   16.29 )-----------( 574      ( 28 Apr 2022 18:17 )             39.8   04-28    137  |  102  |  19  ----------------------------<  137<H>  4.3   |  19<L>  |  0.71    Ca    9.2      28 Apr 2022 18:17  Mg     2.30     04-28    TPro  7.8  /  Alb  4.0  /  TBili  1.0  /  DBili  x   /  AST  17  /  ALT  7   /  AlkPhos  155<H>  04-28    EKG personally reviewed and interpreted: NSR @ 85bpm, no ST depressions or elevations    Imaging reviewed below:  CTPA: Acute bilateral pulmonary emboli with suggestion of right heart strain and infarcts in the right and left lower lobes.      Echo ordered and pending

## 2022-04-29 NOTE — H&P ADULT - EXTREMITIES COMMENTS
b/l Upper thighs with surgical scars and stiches, healing well with bandage covering. b/l Upper thighs with surgical scars and stiches, healing well with bandage covering. No bleeding

## 2022-04-29 NOTE — PHYSICAL THERAPY INITIAL EVALUATION ADULT - GENERAL OBSERVATIONS, REHAB EVAL
Pt received semi supine in bed, +IV, +tele , 2LO2NC , pt with mild SOB with minimal activities- RN Sanjana made aware.

## 2022-04-29 NOTE — H&P ADULT - NSHPSOCIALHISTORY_GEN_ALL_CORE
.  Lives with the spouse.  Nicotine: Quit 40 years ago after 5 pack years.  ETOH Denies  Illicit drugs: denies  Retired [place] [employer]  Mammogram 2021 : Normal  Colonoscopy : 2017 : Normal.  Flu vax:: 2021  COVID Vax X 3  Shingles vaccine X 2.

## 2022-04-29 NOTE — H&P ADULT - ASSESSMENT
66F, obese history of anxiety, Hyperlipidemia, GERD s/p b/l lateral thigh lift 4/11/22, admitted for COVARRUBIAS found to have b/l P.E. with R heart strain.

## 2022-04-29 NOTE — PATIENT PROFILE ADULT - FUNCTIONAL ASSESSMENT - BASIC MOBILITY 6.
INDICATION:

IM NAIL IN OR Status post arthroplasty.



COMPARISON:

None.



TECHNIQUE:

Intraoperative fluoroscopic imaging using portable C-arm technique.



FINDINGS:

Patient is status post satisfactory open reduction and fixation for proximal

intertrochanteric femur fracture.  Total fluoroscopic time 130.4 seconds.



IMPRESSION:

Satisfactory open reduction and fixation for proximal femur fracture.





<Electronically signed by Nitin Velazquez > 11/27/21 [id number] = No assist / stand by assistance

## 2022-04-29 NOTE — PROGRESS NOTE ADULT - PROBLEM SELECTOR PLAN 1
Acute bilateral pulmonary emboli with suggestion of right heart strain and infarcts in the right and left lower lobes.  Cardiac monitor.   continue hep gtt, monitor PTT  TTE without RV strain, severe pulmonary HTN noted  Monitor CBC.  Consider transition to po DOAC in next 24 h

## 2022-04-29 NOTE — CONSULT NOTE ADULT - SUBJECTIVE AND OBJECTIVE BOX
HPI: Patient is a 65yo F with PMHx of Anxiety, GERD, HLD, s/p b/l thigh lift procedure 22 at [facility] who presents to the ED with COVARRUBIAS. Patient has been experiencing these symptoms for the last 4-5 days. Symptoms exacerbated with movement like walking and alleviated with rest. Patient went for a follow up appointment today and was told to come to ED for eval. In the ED, CT Chest performed revealing acute b/l PE with suggestion of right heart strain. Patient started on heparin gtt and CCU consulted for new diagnosis.     Patient seen and examined at the bedside. Seen lying in NAD. States that her symptoms are fairly much the same as before while lying still. Vitals obtained at the bedside: HR 98 (sinus), /74, RR 20, O2 97% on RA. Patient not complaining of any active shortness of breath, headaches, dizziness, chest pain, palpitations, abdominal pain, N/V at the time of interview.     Allergies  No Known Allergies    Intolerances    MEDICATIONS  (STANDING):  heparin  Infusion.  Unit(s)/Hr (18 mL/Hr) IV Continuous <Continuous>  sodium chloride 0.9% lock flush 3 milliLiter(s) IV Push every 8 hours    MEDICATIONS  (PRN):  heparin   Injectable 8000 Unit(s) IV Push every 6 hours PRN For aPTT less than 40  heparin   Injectable 4000 Unit(s) IV Push every 6 hours PRN For aPTT between 40 - 57    Daily Height in cm: 160.02 (2022 02:11)    Daily     Drug Dosing Weight  Height (cm): 160 (2022 02:11)  Weight (kg): 96.2 (2022 02:11)  BMI (kg/m2): 37.6 (2022 02:11)  BSA (m2): 1.98 (2022 02:11)    PAST MEDICAL & SURGICAL HISTORY:  Hypercholesterolemia  Acid reflux  Anxiety  H/O laparoscopic adjustable gastric banding    S/P cholecystectomy  S/P hernia repair  x 4  S/P   S/P tonsillectomy  S/P left knee arthroscopy  S/P LASIK surgery of both eyes  H/O left breast biopsy  Incisional hernia    FAMILY HISTORY:  FH: esophageal cancer (Father)  FH: brain aneurysm (Sibling)  FH: colon cancer (Sibling)    SOCIAL HISTORY: Denies smoking, ETOH, illicit drug use     REVIEW OF SYSTEMS:  CONSTITUTIONAL: No fever, weight loss, or fatigue  EYES: No eye pain, visual disturbances, or discharge  ENMT:  No difficulty hearing, tinnitus, vertigo; No sinus or throat pain  NECK: No pain or stiffness  BREASTS: No pain, masses, or nipple discharge  RESPIRATORY: No cough, wheezing, chills or hemoptysis; No shortness of breath  CARDIOVASCULAR: No chest pain, palpitations, dizziness, or leg swelling  GASTROINTESTINAL: No abdominal or epigastric pain. No nausea, vomiting, or hematemesis; No diarrhea or constipation. No melena or hematochezia.  GENITOURINARY: No dysuria, frequency, hematuria, or incontinence  NEUROLOGICAL: No headaches, memory loss, loss of strength, numbness, or tremors  SKIN: No itching, burning, rashes, or lesions   LYMPH NODES: No enlarged glands  ENDOCRINE: No heat or cold intolerance; No hair loss  MUSCULOSKELETAL: No joint pain or swelling; No muscle, back, or extremity pain    ICU Vital Signs Last 24 Hrs  T(C): 36.4 (2022 04:45), Max: 36.8 (2022 16:00)  T(F): 97.6 (2022 04:45), Max: 98.2 (2022 16:00)  HR: 85 (2022 04:45) (74 - 110)  BP: 120/60 (2022 04:45) (120/60 - 141/87)  RR: 17 (2022 04:45) (16 - 24)  SpO2: 97% (2022 04:45) (95% - 100%)    PHYSICAL EXAM:  GENERAL: NAD, well-groomed, well-developed  HEAD:  Atraumatic, Normocephalic  EYES: EOMI, PERRLA, conjunctiva and sclera clear  ENMT: No tonsillar erythema, exudates, or enlargement; Moist mucous membranes, Good dentition, No lesions  NECK: Supple, No JVD, Normal thyroid  NERVOUS SYSTEM:  Alert & Oriented X3, Good concentration; Motor Strength 5/5 B/L upper and lower extremities; DTRs 2+ intact and symmetric  CHEST/LUNG: Clear to percussion bilaterally; No rales, rhonchi, wheezing, or rubs  HEART: Regular rate and rhythm; No murmurs, rubs, or gallops  ABDOMEN: Soft, Nontender, Nondistended; Bowel sounds present  EXTREMITIES:  2+ Peripheral Pulses, No clubbing, cyanosis, or edema     LABS:  CBC Full  -  ( 2022 04:37 )  WBC Count : 15.60 K/uL  RBC Count : 4.12 M/uL  Hemoglobin : 11.5 g/dL  Hematocrit : 36.5 %  Platelet Count - Automated : 525 K/uL  Mean Cell Volume : 88.6 fL  Mean Cell Hemoglobin : 27.9 pg  Mean Cell Hemoglobin Concentration : 31.5 gm/dL  Auto Neutrophil # : 10.18 K/uL  Auto Lymphocyte # : 4.00 K/uL  Auto Monocyte # : 1.23 K/uL  Auto Eosinophil # : 0.04 K/uL  Auto Basophil # : 0.11 K/uL  Auto Neutrophil % : 65.2 %  Auto Lymphocyte % : 25.6 %  Auto Monocyte % : 7.9 %  Auto Eosinophil % : 0.3 %  Auto Basophil % : 0.7 %        136  |  101  |  18  ----------------------------<  119<H>  3.9   |  20<L>  |  0.79    Ca    9.2      2022 04:37  Mg     2.30         TPro  7.8  /  Alb  4.0  /  TBili  1.0  /  DBili  x   /  AST  17  /  ALT  7   /  AlkPhos  155<H>      CAPILLARY BLOOD GLUCOSE        PT/INR - ( 2022 21:25 )   PT: 12.9 sec;   INR: 1.11 ratio         PTT - ( 2022 04:37 )  PTT:183.1 sec

## 2022-04-29 NOTE — H&P ADULT - PROBLEM SELECTOR PLAN 1
Acute bilateral pulmonary emboli with suggestion of right heart strain and infarcts in the right and left lower lobes.  Cardiac monitor.   F/U official CCU consult. Verbally told by #[id number], the pt is not a CCU candidate.   Continue Heparin Drip and adjust dependant on PTT. Acute bilateral pulmonary emboli with suggestion of right heart strain and infarcts in the right and left lower lobes.  Cardiac monitor.   F/U official CCU consult. Verbally told by #[id number], the pt is not a CCU candidate.   Continue Heparin Drip and adjust dependant on PTT.  F/U TTE  Monitor CBC.  Consider transition to po DOAC.

## 2022-04-29 NOTE — PROGRESS NOTE ADULT - ATTENDING COMMENTS
Pt seen and examined. Agree with above  Events and findings noted.  Currently on AC for PE  SOB improved  HD stable    Legs- incisions cdi    Plan  -Surgical sites healing well  -AC as per primary team  -Will follow with you

## 2022-04-29 NOTE — CONSULT NOTE ADULT - ASSESSMENT
65yo F with PMHx of Anxiety, GERD, HLD, s/p b/l thigh lift procedure 4/11/22 who presents to the ED with SOB X 4 days, found to have acute bilateral PE. CCU consulted for right heart strain. Currently hemodynamically stable and not on supplemental O2 at the time of interview. Case reviewed in detail with CCU Fellow.    -- Not a CCU candidate at this time  -- Continue with heparin gtt, tele monitoring  -- Formal ECHO in the AM  -- Cardiology to follow up and monitor closely  -- If patient worsens, re-consult CCU

## 2022-04-29 NOTE — H&P ADULT - PROBLEM SELECTOR PLAN 3
WBC= 16.29  IANC= 13.66.  A febrile.  vLactate = 1.8.  F/U UA.  Possible secondary to CTPA with infarcts in the right and left lower lobes vs P.E. vs 4/11 surgery.   Will hold abx for now until pt spike fever and source is identified.  Monitor WBC.

## 2022-04-29 NOTE — PATIENT PROFILE ADULT - FALL HARM RISK - HARM RISK INTERVENTIONS
Assistance with ambulation/Assistance OOB with selected safe patient handling equipment/Communicate Risk of Fall with Harm to all staff/Reinforce activity limits and safety measures with patient and family/Tailored Fall Risk Interventions/Use of alarms - bed, chair and/or voice tab/Visual Cue: Yellow wristband and red socks/Bed in lowest position, wheels locked, appropriate side rails in place/Call bell, personal items and telephone in reach/Instruct patient to call for assistance before getting out of bed or chair/Non-slip footwear when patient is out of bed/[place] to call system/Physically safe environment - no spills, clutter or unnecessary equipment/Purposeful Proactive Rounding/Room/bathroom lighting operational, light cord in reach

## 2022-04-29 NOTE — H&P ADULT - PROBLEM SELECTOR PLAN 7
Call plastics 4/29 AM and let the know the pt is currently admitted.   b/l Thigh lift done 4/ 11 @ [facility].  Pt nino. Seen by plastics with R FREDDIE drain removed  b/l Thigh lift done 4/ 11 @ [facility].  Pt nino.

## 2022-04-30 ENCOUNTER — TRANSCRIPTION ENCOUNTER (OUTPATIENT)
Age: 67
End: 2022-04-30

## 2022-04-30 LAB
ANION GAP SERPL CALC-SCNC: 15 MMOL/L — HIGH (ref 7–14)
APTT BLD: 67.2 SEC — HIGH (ref 27–36.3)
BUN SERPL-MCNC: 16 MG/DL — SIGNIFICANT CHANGE UP (ref 7–23)
CALCIUM SERPL-MCNC: 9.1 MG/DL — SIGNIFICANT CHANGE UP (ref 8.4–10.5)
CHLORIDE SERPL-SCNC: 97 MMOL/L — LOW (ref 98–107)
CO2 SERPL-SCNC: 20 MMOL/L — LOW (ref 22–31)
CREAT SERPL-MCNC: 0.87 MG/DL — SIGNIFICANT CHANGE UP (ref 0.5–1.3)
EGFR: 73 ML/MIN/1.73M2 — SIGNIFICANT CHANGE UP
GLUCOSE SERPL-MCNC: 103 MG/DL — HIGH (ref 70–99)
HCT VFR BLD CALC: 35 % — SIGNIFICANT CHANGE UP (ref 34.5–45)
HGB BLD-MCNC: 11 G/DL — LOW (ref 11.5–15.5)
INR BLD: 1.15 RATIO — SIGNIFICANT CHANGE UP (ref 0.88–1.16)
MAGNESIUM SERPL-MCNC: 2.3 MG/DL — SIGNIFICANT CHANGE UP (ref 1.6–2.6)
MCHC RBC-ENTMCNC: 27.9 PG — SIGNIFICANT CHANGE UP (ref 27–34)
MCHC RBC-ENTMCNC: 31.4 GM/DL — LOW (ref 32–36)
MCV RBC AUTO: 88.8 FL — SIGNIFICANT CHANGE UP (ref 80–100)
NRBC # BLD: 0 /100 WBCS — SIGNIFICANT CHANGE UP
NRBC # FLD: 0 K/UL — SIGNIFICANT CHANGE UP
PHOSPHATE SERPL-MCNC: 4 MG/DL — SIGNIFICANT CHANGE UP (ref 2.5–4.5)
PLATELET # BLD AUTO: 498 K/UL — HIGH (ref 150–400)
POTASSIUM SERPL-MCNC: 3.5 MMOL/L — SIGNIFICANT CHANGE UP (ref 3.5–5.3)
POTASSIUM SERPL-SCNC: 3.5 MMOL/L — SIGNIFICANT CHANGE UP (ref 3.5–5.3)
PROTHROM AB SERPL-ACNC: 13.4 SEC — SIGNIFICANT CHANGE UP (ref 10.5–13.4)
RBC # BLD: 3.94 M/UL — SIGNIFICANT CHANGE UP (ref 3.8–5.2)
RBC # FLD: 14.4 % — SIGNIFICANT CHANGE UP (ref 10.3–14.5)
SODIUM SERPL-SCNC: 132 MMOL/L — LOW (ref 135–145)
WBC # BLD: 11.65 K/UL — HIGH (ref 3.8–10.5)
WBC # FLD AUTO: 11.65 K/UL — HIGH (ref 3.8–10.5)

## 2022-04-30 PROCEDURE — 99233 SBSQ HOSP IP/OBS HIGH 50: CPT

## 2022-04-30 PROCEDURE — 93970 EXTREMITY STUDY: CPT | Mod: 26

## 2022-04-30 RX ORDER — POTASSIUM CHLORIDE 20 MEQ
20 PACKET (EA) ORAL ONCE
Refills: 0 | Status: COMPLETED | OUTPATIENT
Start: 2022-04-30 | End: 2022-04-30

## 2022-04-30 RX ORDER — RIVAROXABAN 15 MG-20MG
15 KIT ORAL
Refills: 0 | Status: DISCONTINUED | OUTPATIENT
Start: 2022-04-30 | End: 2022-05-01

## 2022-04-30 RX ORDER — RIVAROXABAN 15 MG-20MG
15 KIT ORAL
Qty: 51 | Refills: 0
Start: 2022-04-30 | End: 2022-05-29

## 2022-04-30 RX ORDER — APIXABAN 2.5 MG/1
10 TABLET, FILM COATED ORAL
Qty: 14 | Refills: 0
Start: 2022-04-30 | End: 2022-05-06

## 2022-04-30 RX ORDER — RIVAROXABAN 15 MG-20MG
15 KIT ORAL
Qty: 42 | Refills: 0
Start: 2022-04-30 | End: 2022-05-20

## 2022-04-30 RX ADMIN — HEPARIN SODIUM 1500 UNIT(S)/HR: 5000 INJECTION INTRAVENOUS; SUBCUTANEOUS at 07:38

## 2022-04-30 RX ADMIN — HEPARIN SODIUM 1500 UNIT(S)/HR: 5000 INJECTION INTRAVENOUS; SUBCUTANEOUS at 05:01

## 2022-04-30 RX ADMIN — SODIUM CHLORIDE 3 MILLILITER(S): 9 INJECTION INTRAMUSCULAR; INTRAVENOUS; SUBCUTANEOUS at 13:25

## 2022-04-30 RX ADMIN — HEPARIN SODIUM 1500 UNIT(S)/HR: 5000 INJECTION INTRAVENOUS; SUBCUTANEOUS at 11:44

## 2022-04-30 RX ADMIN — RIVAROXABAN 15 MILLIGRAM(S): KIT at 17:03

## 2022-04-30 RX ADMIN — SODIUM CHLORIDE 3 MILLILITER(S): 9 INJECTION INTRAMUSCULAR; INTRAVENOUS; SUBCUTANEOUS at 06:03

## 2022-04-30 RX ADMIN — SODIUM CHLORIDE 3 MILLILITER(S): 9 INJECTION INTRAMUSCULAR; INTRAVENOUS; SUBCUTANEOUS at 21:27

## 2022-04-30 RX ADMIN — CEFTRIAXONE 100 MILLIGRAM(S): 500 INJECTION, POWDER, FOR SOLUTION INTRAMUSCULAR; INTRAVENOUS at 20:34

## 2022-04-30 RX ADMIN — Medication 20 MILLIEQUIVALENT(S): at 11:44

## 2022-04-30 NOTE — DISCHARGE NOTE PROVIDER - HOSPITAL COURSE
67yo female with PMHx of anxiety, HLD, GERD s/p b/l lateral thigh lift 4/11/22 at [facility], had L side drain removed 4/20. After her surgery she remained ambulatory. Pt reported that on 4/25 began experiencing COVARRUBIAS after ambulating 5 steps and exertional, fast palpitations that lasted for 1 minute and subsided with rest. Pt had history of recent flight to [place], however denies hx of prior DVT/PE, malignancy, hemoptysis. Upon CTA, pt was found to have acute bilateral pulmonary emboli with suggestion of right heart strain and infarcts in the right and left lower lobe.     Hospital course:   Bilateral pulmonary embolism.   - Acute bilateral pulmonary emboli with suggestion of right heart strain and infarcts in the right and left lower lobes.  - Cardiac monitor.   - continue hep gtt, monitor PTT  - TTE without RV strain, severe pulmonary HTN noted  - Monitor CBC.  - transition to xarelto, 15 mg bid x 21 days and then 20mg daily  - seen by PT yesterday, deferred further amb due to SpO2 87% and elevated HR , after rest SpO2 97% at 2LO  - will reassess O2 sats with ambulation today.    Swelling of both lower extremities.   - Duplex with Acute, non-occlusive deep vein thrombosis noted in the  right popliteal and posterior tibial veins.  continue AC.    Leukocytosis.   - likely reactive, secondary to CTPA with infarcts in the right and left lower lobes  improved from yesterday.    Anxiety.   - Continue Fluoxetine.    Hyperlipidemia.   - Continue Statin.    GERD (gastroesophageal reflux disease).   ·  Plan: Continue PPI.    Status post thigh lift.   - Seen by plastics with R FREDDIE drain removed  - b/l Thigh lift done 4/ 11 @ [facility].  - Pt eval:     Preventive measure.   ·  Plan: VTE Covered with Heparin drip --> transitioned to        65yo female with PMHx of anxiety, HLD, GERD s/p b/l lateral thigh lift 4/11/22 at [facility], had L side drain removed 4/20. After her surgery she remained ambulatory. Pt reported that on 4/25 began experiencing COVARRUBIAS after ambulating 5 steps and exertional, fast palpitations that lasted for 1 minute and subsided with rest. Pt had history of recent flight to [place], however denies hx of prior DVT/PE, malignancy, hemoptysis. Upon CTA, pt was found to have acute bilateral pulmonary emboli with suggestion of right heart strain and infarcts in the right and left lower lobe.     Patient was started on a Heparin gtt which was later transitioned to Xarelto after several days on Heparin gtt. Patient's vital signs normalized. TTE without RV strain, severe pulmonary HTN noted. Patient was told to follow up with pulmonary outpatient for pulm HTN. On 5/01 pt is medically stable for discharge home as d/w Dr. Jarvis.    65yo female with PMHx of anxiety, HLD, GERD s/p b/l lateral thigh lift 4/11/22 at [facility], had L side drain removed 4/20. After her surgery she remained ambulatory. Pt reported that on 4/25 began experiencing COVARRUBIAS after ambulating 5 steps and exertional, fast palpitations that lasted for 1 minute and subsided with rest. Pt had history of recent flight to [place], however denies hx of prior DVT/PE, malignancy, hemoptysis. Upon CTA, pt was found to have acute bilateral pulmonary emboli with suggestion of right heart strain and infarcts in the right and left lower lobe.     Patient was started on a Heparin gtt which was later transitioned to Xarelto after several days on Heparin gtt. Patient's vital signs normalized. TTE without RV strain, severe pulmonary HTN noted. Patient was told to follow up with pulmonary outpatient for pulm HTN. On 5/01 pt is medically stable for discharge home as d/w Dr. Jarvis. Patient was treated for an uncomplicated UTI with ceftriaoxone for 3 days.

## 2022-04-30 NOTE — DISCHARGE NOTE PROVIDER - CARE PROVIDER_API CALL
Sonya Singleton  INTERNAL MEDICINE  [address] [place] [address]  Phone: [phone]  Fax: [phone]  Follow Up Time:

## 2022-04-30 NOTE — PROGRESS NOTE ADULT - PROBLEM SELECTOR PLAN 1
Acute bilateral pulmonary emboli with suggestion of right heart strain and infarcts in the right and left lower lobes.  Cardiac monitor.   continue hep gtt, monitor PTT  TTE without RV strain, severe pulmonary HTN noted  Monitor CBC.  transition to xarelto, 15 mg bid x 21 days and then 20mg daily  f/u LE duplex Acute bilateral pulmonary emboli with suggestion of right heart strain and infarcts in the right and left lower lobes.  Cardiac monitor.   continue hep gtt, monitor PTT  TTE without RV strain, severe pulmonary HTN noted  Monitor CBC.  transition to xarelto, 15 mg bid x 21 days and then 20mg daily  seen by PT yesterday, deferred further amb due to SpO2 87% and elevated HR , after rest SpO2 97% at 2LO  will reassess O2 sats with ambulation today

## 2022-04-30 NOTE — DISCHARGE NOTE PROVIDER - NSDCMRMEDTOKEN_GEN_ALL_CORE_FT
Advil 200 mg oral tablet: 2 tab(s) orally every 6 hours, As Needed  atorvastatin 20 mg oral tablet: 1 tab(s) orally once a day  FLUoxetine 20 mg oral capsule: 1 cap(s) orally once a day  pantoprazole 40 mg oral delayed release tablet: 1 tab(s) orally once a day  Unisom SleepMelts 25 mg oral tablet, disintegratin tab(s) orally once a day (at bedtime), As Needed   Advil 200 mg oral tablet: 2 tab(s) orally every 6 hours, As Needed  atorvastatin 20 mg oral tablet: 1 tab(s) orally once a day  FLUoxetine 20 mg oral capsule: 1 cap(s) orally once a day  pantoprazole 40 mg oral delayed release tablet: 1 tab(s) orally once a day  Unisom SleepMelts 25 mg oral tablet, disintegratin tab(s) orally once a day (at bedtime), As Needed  Xarelto Starter Pack 15 mg-20 mg oral kit: 15 milligram(s) orally 2 times a day x 21 days    Advil 200 mg oral tablet: 2 tab(s) orally every 6 hours, As Needed  atorvastatin 20 mg oral tablet: 1 tab(s) orally once a day  FLUoxetine 20 mg oral capsule: 1 cap(s) orally once a day  pantoprazole 40 mg oral delayed release tablet: 1 tab(s) orally once a day  Unisom SleepMelts 25 mg oral tablet, disintegratin tab(s) orally once a day (at bedtime), As Needed  Xarelto Starter Pack 15 mg-20 mg oral kit: 15 milligram(s) orally 2 times a day x 21 days  then 20mgs once a day    Advil 200 mg oral tablet: 2 tab(s) orally every 6 hours, As Needed  atorvastatin 20 mg oral tablet: 1 tab(s) orally once a day  Eliquis Starter Pack for Treatment of DVT and PE 5 mg oral tablet: 10 milligram(s) orally every 12 hours x first 7 days   FLUoxetine 20 mg oral capsule: 1 cap(s) orally once a day  pantoprazole 40 mg oral delayed release tablet: 1 tab(s) orally once a day  Unisom SleepMelts 25 mg oral tablet, disintegratin tab(s) orally once a day (at bedtime), As Needed  Xarelto Starter Pack 15 mg-20 mg oral kit: 15 milligram(s) orally 2 times a day x 21 days  then 20mgs once a day

## 2022-04-30 NOTE — DISCHARGE NOTE PROVIDER - NSDCCPCAREPLAN_GEN_ALL_CORE_FT
PRINCIPAL DISCHARGE DIAGNOSIS  Diagnosis: Pulmonary embolism  Assessment and Plan of Treatment: You presented to the [facility] after experiencing difficulty breathing. CT angiogram of the lungs showed that you had bilateral pulmonary embolism (lung clots) with right heart strain. Your were admitted to the medicine floor and placed on heart monitoring. You were started on IV heparin to help dissolve the clots and was switched over to oral medication that you can continue taking at home. Your echocardiogram of the heart confirmed that you had right ventricular strain due with severe pulmonary hypertension. Please continue to follow up with your prmiary care, outpatient pulmonologist and cardiologist for further management. Also, obtain repeat echocardiogram 3 months after the discharge for monitoring of the heart. Your oxygen on ambulation was tested and it showed that you ___________- require home O2?      SECONDARY DISCHARGE DIAGNOSES  Diagnosis: Swelling of both lower extremities  Assessment and Plan of Treatment: You were also seen to have bilateral lower extremity swelling. Lower extremity ultrasound showed that you had non-occlusive deep vein thrombosis on the right leg. You were started on anticoagulation medication for pulmonary embolism which also should help with treating the lower extremity clot. Please continue to take your medication and follow up with your primary care provider for further management after the discharge.     PRINCIPAL DISCHARGE DIAGNOSIS  Diagnosis: Pulmonary embolism  Assessment and Plan of Treatment: You presented to the [facility] after experiencing difficulty breathing. CT angiogram of the lungs showed that you had bilateral pulmonary embolism (lung clots) with right heart strain. Your were admitted to the medicine floor and placed on heart monitoring. You were started on IV heparin to help dissolve the clots and was switched over to oral medication that you can continue taking at home. Your echocardiogram of the heart confirmed that you had right ventricular strain due with severe pulmonary hypertension. Please continue to follow up with your prmiary care, outpatient pulmonologist and cardiologist for further management. Also, obtain repeat echocardiogram 3 months after the discharge for monitoring of the heart.      SECONDARY DISCHARGE DIAGNOSES  Diagnosis: Swelling of both lower extremities  Assessment and Plan of Treatment: You were also seen to have bilateral lower extremity swelling. Lower extremity ultrasound showed that you had non-occlusive deep vein thrombosis on the right leg. You were started on anticoagulation medication for pulmonary embolism which also should help with treating the lower extremity clot. Please continue to take your medication and follow up with your primary care provider for further management after the discharge.

## 2022-05-01 ENCOUNTER — TRANSCRIPTION ENCOUNTER (OUTPATIENT)
Age: 67
End: 2022-05-01

## 2022-05-01 VITALS
HEART RATE: 72 BPM | RESPIRATION RATE: 19 BRPM | DIASTOLIC BLOOD PRESSURE: 72 MMHG | SYSTOLIC BLOOD PRESSURE: 126 MMHG | TEMPERATURE: 98 F | OXYGEN SATURATION: 97 %

## 2022-05-01 LAB
ANION GAP SERPL CALC-SCNC: 12 MMOL/L — SIGNIFICANT CHANGE UP (ref 7–14)
APTT BLD: 29.9 SEC — SIGNIFICANT CHANGE UP (ref 27–36.3)
BUN SERPL-MCNC: 16 MG/DL — SIGNIFICANT CHANGE UP (ref 7–23)
CALCIUM SERPL-MCNC: 9.1 MG/DL — SIGNIFICANT CHANGE UP (ref 8.4–10.5)
CHLORIDE SERPL-SCNC: 104 MMOL/L — SIGNIFICANT CHANGE UP (ref 98–107)
CO2 SERPL-SCNC: 21 MMOL/L — LOW (ref 22–31)
CREAT SERPL-MCNC: 0.8 MG/DL — SIGNIFICANT CHANGE UP (ref 0.5–1.3)
EGFR: 81 ML/MIN/1.73M2 — SIGNIFICANT CHANGE UP
GLUCOSE SERPL-MCNC: 92 MG/DL — SIGNIFICANT CHANGE UP (ref 70–99)
HCT VFR BLD CALC: 33.8 % — LOW (ref 34.5–45)
HGB BLD-MCNC: 10.5 G/DL — LOW (ref 11.5–15.5)
INR BLD: 1.77 RATIO — HIGH (ref 0.88–1.16)
MAGNESIUM SERPL-MCNC: 2.3 MG/DL — SIGNIFICANT CHANGE UP (ref 1.6–2.6)
MCHC RBC-ENTMCNC: 27.4 PG — SIGNIFICANT CHANGE UP (ref 27–34)
MCHC RBC-ENTMCNC: 31.1 GM/DL — LOW (ref 32–36)
MCV RBC AUTO: 88.3 FL — SIGNIFICANT CHANGE UP (ref 80–100)
NRBC # BLD: 0 /100 WBCS — SIGNIFICANT CHANGE UP
NRBC # FLD: 0 K/UL — SIGNIFICANT CHANGE UP
PHOSPHATE SERPL-MCNC: 3.8 MG/DL — SIGNIFICANT CHANGE UP (ref 2.5–4.5)
PLATELET # BLD AUTO: 509 K/UL — HIGH (ref 150–400)
POTASSIUM SERPL-MCNC: 3.9 MMOL/L — SIGNIFICANT CHANGE UP (ref 3.5–5.3)
POTASSIUM SERPL-SCNC: 3.9 MMOL/L — SIGNIFICANT CHANGE UP (ref 3.5–5.3)
PROTHROM AB SERPL-ACNC: 20.6 SEC — HIGH (ref 10.5–13.4)
RBC # BLD: 3.83 M/UL — SIGNIFICANT CHANGE UP (ref 3.8–5.2)
RBC # FLD: 14.2 % — SIGNIFICANT CHANGE UP (ref 10.3–14.5)
SODIUM SERPL-SCNC: 137 MMOL/L — SIGNIFICANT CHANGE UP (ref 135–145)
WBC # BLD: 9.28 K/UL — SIGNIFICANT CHANGE UP (ref 3.8–10.5)
WBC # FLD AUTO: 9.28 K/UL — SIGNIFICANT CHANGE UP (ref 3.8–10.5)

## 2022-05-01 PROCEDURE — 99239 HOSP IP/OBS DSCHRG MGMT >30: CPT

## 2022-05-01 RX ORDER — RIVAROXABAN 15 MG-20MG
15 KIT ORAL
Qty: 51 | Refills: 0
Start: 2022-05-01 | End: 2022-05-30

## 2022-05-01 RX ADMIN — SODIUM CHLORIDE 3 MILLILITER(S): 9 INJECTION INTRAMUSCULAR; INTRAVENOUS; SUBCUTANEOUS at 05:04

## 2022-05-01 RX ADMIN — RIVAROXABAN 15 MILLIGRAM(S): KIT at 08:43

## 2022-05-01 RX ADMIN — SODIUM CHLORIDE 3 MILLILITER(S): 9 INJECTION INTRAMUSCULAR; INTRAVENOUS; SUBCUTANEOUS at 13:52

## 2022-05-01 NOTE — PROGRESS NOTE ADULT - REASON FOR ADMISSION
COVARRUBIAS x 4 days

## 2022-05-01 NOTE — DISCHARGE NOTE NURSING/CASE MANAGEMENT/SOCIAL WORK - NSDCPEFALRISK_GEN_ALL_CORE
For information on Fall & Injury Prevention, visit: [email or web address] OR  [email or web address] OR  https://www.cdc.gov/steadi/patient.html

## 2022-05-01 NOTE — PROGRESS NOTE ADULT - PROBLEM SELECTOR PLAN 8
VTE Covered with Heparin drip.  fall, aspiration, safety precautions.  PT, CCU, plastics sx eval.
VTE Covered with Heparin drip.
VTE Covered with Heparin drip.

## 2022-05-01 NOTE — PROGRESS NOTE ADULT - SUBJECTIVE AND OBJECTIVE BOX
Medicine Progress Note    Patient is a 66y old  Female who presents with a chief complaint of COVARRUBIAS x 4 days (01 May 2022 07:38)      SUBJECTIVE / OVERNIGHT EVENTS: no acute overnight events    ADDITIONAL REVIEW OF SYSTEMS:    MEDICATIONS  (STANDING):  cefTRIAXone   IVPB 1000 milliGRAM(s) IV Intermittent every 24 hours  rivaroxaban 15 milliGRAM(s) Oral two times a day with meals  sodium chloride 0.9% lock flush 3 milliLiter(s) IV Push every 8 hours    MEDICATIONS  (PRN):    CAPILLARY BLOOD GLUCOSE        I&O's Summary      PHYSICAL EXAM:  Vital Signs Last 24 Hrs  T(C): 36.7 (01 May 2022 12:12), Max: 36.7 (01 May 2022 12:12)  T(F): 98.1 (01 May 2022 12:12), Max: 98.1 (01 May 2022 12:12)  HR: 66 (01 May 2022 12:12) (66 - 78)  BP: 122/61 (01 May 2022 12:12) (110/50 - 132/68)  BP(mean): --  RR: 18 (01 May 2022 12:12) (18 - 18)  SpO2: 96% (01 May 2022 12:12) (94% - 96%)  CONSTITUTIONAL: NAD, well-developed, well-groomed  ENMT: Moist oral mucosa, no pharyngeal injection or exudates; normal dentition  RESPIRATORY: Normal respiratory effort; lungs are clear to auscultation bilaterally  CARDIOVASCULAR: Regular rate and rhythm, normal S1 and S2, no murmur/rub/gallop; No lower extremity edema; Peripheral pulses are 2+ bilaterally  ABDOMEN: Nontender to palpation, normoactive bowel sounds, no rebound/guarding; No hepatosplenomegaly  PSYCH: A+O to person, place, and time; affect appropriate  NEUROLOGY: CN 2-12 are intact and symmetric; no gross sensory deficits   SKIN: No rashes; no palpable lesions    LABS:                        10.5   9.28  )-----------( 509      ( 01 May 2022 07:18 )             33.8     05-    137  |  104  |  16  ----------------------------<  92  3.9   |  21<L>  |  0.80    Ca    9.1      01 May 2022 07:18  Phos  3.8     05-01  Mg     2.30     05-01      PT/INR - ( 01 May 2022 07:18 )   PT: 20.6 sec;   INR: 1.77 ratio         PTT - ( 01 May 2022 07:18 )  PTT:29.9 sec      Urinalysis Basic - ( 2022 16:12 )    Color: Yellow / Appearance: Slightly Turbid / S.045 / pH: x  Gluc: x / Ketone: Negative  / Bili: Negative / Urobili: <2 mg/dL   Blood: x / Protein: Trace / Nitrite: Negative   Leuk Esterase: Large / RBC: 2 /HPF / WBC 12 /HPF   Sq Epi: x / Non Sq Epi: 5 /HPF / Bacteria: Few        COVID-19 PCR: NotDetec (2022 18:16)      RADIOLOGY & ADDITIONAL TESTS:  Imaging from Last 24 Hours:    Electrocardiogram/QTc Interval:    COORDINATION OF CARE:  Care Discussed with Consultants/Other Providers:  
Medicine Progress Note    Patient is a 66y old  Female who presents with a chief complaint of COVARRUBIAS x 4 days (2022 12:36)      SUBJECTIVE / OVERNIGHT EVENTS: no acute events overnight    ADDITIONAL REVIEW OF SYSTEMS:    MEDICATIONS  (STANDING):  cefTRIAXone   IVPB 1000 milliGRAM(s) IV Intermittent every 24 hours  heparin  Infusion. 1500 Unit(s)/Hr (15 mL/Hr) IV Continuous <Continuous>  sodium chloride 0.9% lock flush 3 milliLiter(s) IV Push every 8 hours    MEDICATIONS  (PRN):  heparin   Injectable 8000 Unit(s) IV Push every 6 hours PRN For aPTT less than 40  heparin   Injectable 4000 Unit(s) IV Push every 6 hours PRN For aPTT between 40 - 57    CAPILLARY BLOOD GLUCOSE      POCT Blood Glucose.: 106 mg/dL (2022 16:57)    I&O's Summary      PHYSICAL EXAM:  Vital Signs Last 24 Hrs  T(C): 36.6 (2022 12:18), Max: 37.2 (2022 08:05)  T(F): 97.9 (2022 12:18), Max: 98.9 (2022 08:05)  HR: 76 (2022 12:18) (60 - 88)  BP: 124/78 (2022 12:18) (110/60 - 124/78)  BP(mean): --  RR: 18 (2022 12:18) (18 - 18)  SpO2: 98% (2022 12:18) (97% - 99%)  CONSTITUTIONAL: NAD, well-developed, well-groomed  ENMT: Moist oral mucosa, no pharyngeal injection or exudates; normal dentition  RESPIRATORY: Normal respiratory effort; lungs are clear to auscultation bilaterally  CARDIOVASCULAR: Regular rate and rhythm, normal S1 and S2, no murmur/rub/gallop; No lower extremity edema; Peripheral pulses are 2+ bilaterally  ABDOMEN: Nontender to palpation, normoactive bowel sounds, no rebound/guarding; No hepatosplenomegaly  PSYCH: A+O to person, place, and time; affect appropriate  NEUROLOGY: CN 2-12 are intact and symmetric; no gross sensory deficits   SKIN: No rashes; no palpable lesions    LABS:                        11.0   11.65 )-----------( 498      ( 2022 04:41 )             35.0     04-30    132<L>  |  97<L>  |  16  ----------------------------<  103<H>  3.5   |  20<L>  |  0.87    Ca    9.1      2022 04:41  Phos  4.0     04-30  Mg     2.30     30    TPro  7.8  /  Alb  4.0  /  TBili  1.0  /  DBili  x   /  AST  17  /  ALT  7   /  AlkPhos  155<H>      PT/INR - ( 2022 04:41 )   PT: 13.4 sec;   INR: 1.15 ratio         PTT - ( 2022 04:41 )  PTT:67.2 sec      Urinalysis Basic - ( 2022 16:12 )    Color: Yellow / Appearance: Slightly Turbid / S.045 / pH: x  Gluc: x / Ketone: Negative  / Bili: Negative / Urobili: <2 mg/dL   Blood: x / Protein: Trace / Nitrite: Negative   Leuk Esterase: Large / RBC: 2 /HPF / WBC 12 /HPF   Sq Epi: x / Non Sq Epi: 5 /HPF / Bacteria: Few        COVID-19 PCR: NotDetec (2022 18:16)      RADIOLOGY & ADDITIONAL TESTS:  Imaging from Last 24 Hours:    Electrocardiogram/QTc Interval:    COORDINATION OF CARE:  Care Discussed with Consultants/Other Providers:  
HPI:  66F s/p b/l lateral thigh lift 4/11/22 at [facility], presented to Dr Garcia's office today for follow up c/o SOB since Sunday. Found to have PE. Now on Heparin gtt. NAOE.     Physical Exam      T(C): 36.4 (04-29-22 @ 04:45), Max: 36.8 (04-28-22 @ 16:00)  T(F): 97.6 (04-29-22 @ 04:45), Max: 98.2 (04-28-22 @ 16:00)  HR: 85 (04-29-22 @ 04:45) (74 - 110)  BP: 120/60 (04-29-22 @ 04:45) (120/60 - 141/87)  RR: 17 (04-29-22 @ 04:45) (16 - 24)  SpO2: 97% (04-29-22 @ 04:45) (95% - 100%)        Gen: NAD  Speaking in fluent sentences with some shortness of breath.  Right thigh drain SS, ~10cc in bulb  BL LE without edema, not TTP, negative homans sign    < from: CT Angio Chest PE Protocol w/ IV Cont (04.28.22 @ 19:57) >    ACC: [id number] EXAM:  CT ANGIO CHEST PULM [facility]                          PROCEDURE DATE:  04/28/2022          INTERPRETATION:  CLINICAL INFORMATION: Dyspnea.    COMPARISON: None.    CONTRAST/COMPLICATIONS:  IV Contrast: Omnipaque 350  90 cc administered   10 cc discarded  Oral Contrast: NONE  Complications: None reported at time of study completion    PROCEDURE:  CT Angiography of the Chest.  Sagittal and coronal reformats were performed as well as 3D (MIP)   reconstructions.    FINDINGS:    LUNGS AND AIRWAYS: Patent central airways.  Small peripheral   consolidations in the right and left lower lobes, compatible with   pulmonary infarcts.  PLEURA: No pleural effusion.  MEDIASTINUM AND DASHAWN: No lymphadenopathy.  VESSELS: Acute pulmonary emboli in the right upper, right middle, and   right lower lobar pulmonary arteries with extension to the segmental and   subsegmental branches. Additional acute pulmonary emboli in the segmental   and subsegmental branches of the pulmonary arteries on the left side in   the upper and lower lobes.  HEART: Enlarged right ventricle suggestive of right heart strain. No   pericardial effusion.  CHEST WALL AND LOWER NECK: Within normal limits.  VISUALIZED UPPER ABDOMEN: Cholecystectomy. Sleeve gastrectomy. Bilateral   mild renal cortical thinning.  BONES: Within normal limits.    IMPRESSION:  Acute bilateral pulmonary emboli with suggestion of right heart strain   and infarcts in the right and left lower lobes.    Findings were discussed with Dr. White 4/28/2022 8:12 PM by Dr. Augustin with read back confirmation.    --- End of Report ---          REGINA AUGUSTIN MD; Resident Radiologist  This document has been electronically signed.  LAKESHA FIGUEROA M.D., ATTENDING RADIOGIST  This document has been electronically signed. Apr 28 2022  8:20PM    < end of copied text >                 11.5   15.60  )----------(  525       ( 29 Apr 2022 04:37 )               36.5      136    |  101    |  18     ----------------------------<  119        ( 29 Apr 2022 04:37 )  3.9     |  20     |  0.79     Ca    9.2        ( 29 Apr 2022 04:37 )  Mg     2.30      ( 28 Apr 2022 18:17 )    TPro  7.8    /  Alb  4.0    /  TBili  1.0    /  DBili  x      /  AST  17     /  ALT  7      /  AlkPhos  155    ( 28 Apr 2022 18:17 )    PT/INR -  12.9 sec / 1.11 ratio   ( 28 Apr 2022 21:25 )       PTT -  183.1 sec   ( 29 Apr 2022 04:37 )  CAPILLARY BLOOD GLUCOSE        
Medicine Progress Note    Patient is a 66y old  Female who presents with a chief complaint of COVARRUBIAS x 4 days (29 Apr 2022 06:59)      SUBJECTIVE / OVERNIGHT EVENTS: does not report any chest pain or shortness of breath    ADDITIONAL REVIEW OF SYSTEMS:    MEDICATIONS  (STANDING):  heparin  Infusion. 1500 Unit(s)/Hr (15 mL/Hr) IV Continuous <Continuous>  sodium chloride 0.9% lock flush 3 milliLiter(s) IV Push every 8 hours    MEDICATIONS  (PRN):  heparin   Injectable 8000 Unit(s) IV Push every 6 hours PRN For aPTT less than 40  heparin   Injectable 4000 Unit(s) IV Push every 6 hours PRN For aPTT between 40 - 57    CAPILLARY BLOOD GLUCOSE        I&O's Summary      PHYSICAL EXAM:  Vital Signs Last 24 Hrs  T(C): 36.7 (29 Apr 2022 12:05), Max: 36.8 (28 Apr 2022 16:00)  T(F): 98 (29 Apr 2022 12:05), Max: 98.2 (28 Apr 2022 16:00)  HR: 91 (29 Apr 2022 12:05) (74 - 110)  BP: 134/60 (29 Apr 2022 12:05) (120/60 - 141/87)  BP(mean): --  RR: 19 (29 Apr 2022 12:05) (16 - 24)  SpO2: 96% (29 Apr 2022 12:05) (95% - 100%)  CONSTITUTIONAL: NAD  ENMT: Moist oral mucosa, no pharyngeal injection or exudates; normal dentition  RESPIRATORY: Normal respiratory effort; lungs are clear to auscultation bilaterally  CARDIOVASCULAR: Regular rate and rhythm, normal S1 and S2, no murmur/rub/gallop; No TRUDI  ABDOMEN: Nontender to palpation, normoactive bowel sounds, no rebound/guarding; No hepatosplenomegaly  PSYCH: A+O to person, place, and time; affect appropriate  NEUROLOGY: CN 2-12 are intact and symmetric; no gross sensory deficits   SKIN: R/L lateral thigh dressings    LABS:                        11.1   14.17 )-----------( 520      ( 29 Apr 2022 07:52 )             35.5     04-29    132<L>  |  99  |  19  ----------------------------<  115<H>  3.6   |  18<L>  |  0.79    Ca    8.9      29 Apr 2022 07:32  Phos  3.3     04-29  Mg     2.30     04-29    TPro  7.8  /  Alb  4.0  /  TBili  1.0  /  DBili  x   /  AST  17  /  ALT  7   /  AlkPhos  155<H>  04-28    PT/INR - ( 28 Apr 2022 21:25 )   PT: 12.9 sec;   INR: 1.11 ratio         PTT - ( 29 Apr 2022 04:37 )  PTT:183.1 sec          COVID-19 PCR: NotDetec (28 Apr 2022 18:16)      RADIOLOGY & ADDITIONAL TESTS:  Imaging from Last 24 Hours: CT chest    Electrocardiogram/QTc Interval:    COORDINATION OF CARE:  Care Discussed with Consultants/Other Providers:  
No significant events overnight  Denies CP or SOB  CUrrently on hep gtt    Vital Signs Last 24 Hrs  T(C): 36.6 (30 Apr 2022 04:05), Max: 36.8 (29 Apr 2022 16:05)  T(F): 97.8 (30 Apr 2022 04:05), Max: 98.2 (29 Apr 2022 16:05)  HR: 60 (30 Apr 2022 04:05) (60 - 91)  BP: 110/60 (30 Apr 2022 04:05) (110/60 - 134/60)  BP(mean): --  RR: 18 (30 Apr 2022 04:05) (18 - 19)  SpO2: 98% (30 Apr 2022 04:05) (96% - 99%)                            11.0   11.65 )-----------( 498      ( 30 Apr 2022 04:41 )             35.0       Leg - incisions cdi, no drainage, no erythema      
Plastic Surgery    SUBJECTIVE: Pt seen and examined on rounds with team. NAEON.      VITALS  T(C): 36.3 (05-01-22 @ 04:14), Max: 37.2 (04-30-22 @ 08:05)  HR: 66 (05-01-22 @ 04:14) (66 - 78)  BP: 110/50 (05-01-22 @ 04:14) (110/50 - 132/68)  RR: 18 (05-01-22 @ 04:14) (18 - 18)  SpO2: 96% (05-01-22 @ 04:14) (94% - 99%)  CAPILLARY BLOOD GLUCOSE      Physical exam:   General: comfortable  Pulm: breathing easily on room air  Leg - incisions cdi, no drainage, no erythema    Is/Os          MEDICATIONS (STANDING): cefTRIAXone   IVPB 1000 milliGRAM(s) IV Intermittent every 24 hours  rivaroxaban 15 milliGRAM(s) Oral two times a day with meals  sodium chloride 0.9% lock flush 3 milliLiter(s) IV Push every 8 hours    MEDICATIONS (PRN):    LABS  CBC (04-30 @ 04:41)                              11.0<L>                         11.65<H>  )----------------(  498<H>     --    % Neutrophils, --    % Lymphocytes, ANC: --                                  35.0      BMP (04-30 @ 04:41)             132<L>  |  97<L>   |  16    		Ca++ --      Ca 9.1                ---------------------------------( 103<H>		Mg 2.30               3.5     |  20<L>   |  0.87  			Ph 4.0         Coags (05-01 @ 07:18)  aPTT 29.9 / INR 1.77<H> / PT 20.6<H>  Coags (04-30 @ 04:41)  aPTT 67.2<H> / INR 1.15 / PT 13.4            IMAGING STUDIES

## 2022-05-01 NOTE — DISCHARGE NOTE NURSING/CASE MANAGEMENT/SOCIAL WORK - PATIENT PORTAL LINK FT
You can access the FollowMyHealth Patient Portal offered by [facility] by registering at the following website: [email or web address]. By joining [employer]’s FollowMyHealth portal, you will also be able to view your health information using other applications (apps) compatible with our system.

## 2022-05-01 NOTE — PROGRESS NOTE ADULT - PROBLEM SELECTOR PLAN 3
likely reactive, secondary to CTPA with infarcts in the right and left lower lobes  improved
likely reactive, secondary to CTPA with infarcts in the right and left lower lobes  improved from yesterday
likely reactive, secondary to CTPA with infarcts in the right and left lower lobes

## 2022-05-01 NOTE — PROGRESS NOTE ADULT - ASSESSMENT
66F s/p bl lateral thigh lift now p/w SOB, found to have PE    - CT PA - PE found  - surgical site stable  - continue care per primary team     Maged Johnston MD, PhD  Plastic Surgery Resident, PGY3  [facility]: [phone]  [facility]: [id number]  Available on Microsoft Teams 
Plan-s/p bilateral thigh lift 4/11/22, admitted for SOB and work up revealed PE. Started on anticoagulation.      -Cont AC as per medicine  -Surgical sites healing well  -OOB  -PT eval  -Cont current care  -Will follow with you  
Plan-s/p bilateral thigh lift 4/11/22, admitted for SOB and work up revealed PE. Started on anticoagulation.      -Cont AC as per medicine  -Surgical sites healing well  -OOB  -PT eval  -Cont current care  -Will follow with you    Dimitris Coates PGY1  Plastic Surgery  [phone]# [facility] pager  [phone] [facility] pager 
66F, obese history of anxiety, Hyperlipidemia, GERD s/p b/l lateral thigh lift 4/11/22, admitted for COVARRUBIAS found to have b/l P.E. with R heart strain. 
66F, obese history of anxiety, Hyperlipidemia, GERD s/p b/l lateral thigh lift 4/11/22, admitted for COVARRUBIAS found to have b/l P.E. with R heart strain. 
66F, obese history of anxiety, Hyperlipidemia, GERD s/p b/l lateral thigh lift 4/11/22, admitted for COVARRUBAIS found to have b/l P.E. with R heart strain.

## 2022-05-01 NOTE — PROGRESS NOTE ADULT - PROBLEM SELECTOR PROBLEM 2
Swelling of both lower extremities

## 2022-05-01 NOTE — PROGRESS NOTE ADULT - PROBLEM SELECTOR PLAN 7
Seen by plastics with R FREDDIE drain removed  b/l Thigh lift done 4/ 11 @ [facility].  Pt nino.
Seen by plastics with R FREDDIE drain removed  b/l Thigh lift done 4/ 11 @ .  Pt eval: to home
Seen by plastics with R FREDDIE drain removed  b/l Thigh lift done 4/ 11 @ [facility].  Pt nino.

## 2022-05-01 NOTE — PROGRESS NOTE ADULT - PROBLEM SELECTOR PLAN 1
stable on xarelto loading dose; instructions provided regarding follow up   35 minutes on discharge plan of care including reviewing follow up care, need for repeat echo, pulmonary evaluation regarding duration of AC, pulmonary htn assessment

## 2022-05-01 NOTE — PROGRESS NOTE ADULT - PROBLEM SELECTOR PLAN 2
Duplex with Acute, non-occlusive deep vein thrombosis noted in the  right popliteal and posterior tibial veins.  continue AC
Duplex with Acute, non-occlusive deep vein thrombosis noted in the  right popliteal and posterior tibial veins.  continue AC
F/U B/L LE duplex to assess for DVT

## 2022-05-02 LAB
CULTURE RESULTS: SIGNIFICANT CHANGE UP
SPECIMEN SOURCE: SIGNIFICANT CHANGE UP

## 2022-05-07 ENCOUNTER — APPOINTMENT (OUTPATIENT)
Dept: PLASTIC SURGERY | Facility: CLINIC | Age: 67
End: 2022-05-07

## 2022-05-07 VITALS
OXYGEN SATURATION: 95 % | SYSTOLIC BLOOD PRESSURE: 135 MMHG | HEART RATE: 86 BPM | DIASTOLIC BLOOD PRESSURE: 75 MMHG | BODY MASS INDEX: 37.44 KG/M2 | HEIGHT: 63 IN | TEMPERATURE: 97.4 F | WEIGHT: 211.3 LBS

## 2022-05-07 DIAGNOSIS — Z09 ENCOUNTER FOR FOLLOW-UP EXAMINATION AFTER COMPLETED TREATMENT FOR CONDITIONS OTHER THAN MALIGNANT NEOPLASM: ICD-10-CM

## 2022-05-07 DIAGNOSIS — E88.1 LIPODYSTROPHY, NOT ELSEWHERE CLASSIFIED: ICD-10-CM

## 2022-05-07 PROCEDURE — 99024 POSTOP FOLLOW-UP VISIT: CPT

## 2022-05-26 ENCOUNTER — APPOINTMENT (OUTPATIENT)
Dept: PLASTIC SURGERY | Facility: CLINIC | Age: 67
End: 2022-05-26

## 2022-05-26 VITALS
WEIGHT: 212 LBS | DIASTOLIC BLOOD PRESSURE: 75 MMHG | TEMPERATURE: 97.4 F | OXYGEN SATURATION: 99 % | HEIGHT: 63 IN | SYSTOLIC BLOOD PRESSURE: 150 MMHG | HEART RATE: 90 BPM | BODY MASS INDEX: 37.56 KG/M2

## 2022-06-02 ENCOUNTER — APPOINTMENT (OUTPATIENT)
Dept: PLASTIC SURGERY | Facility: CLINIC | Age: 67
End: 2022-06-02

## 2022-06-02 VITALS
OXYGEN SATURATION: 98 % | DIASTOLIC BLOOD PRESSURE: 69 MMHG | BODY MASS INDEX: 37.21 KG/M2 | HEIGHT: 63 IN | HEART RATE: 78 BPM | SYSTOLIC BLOOD PRESSURE: 121 MMHG | TEMPERATURE: 97.7 F | WEIGHT: 210 LBS

## 2022-06-22 NOTE — PATIENT PROFILE ADULT - ANY SIGNIFICANT CHANGE IN ABILITY TO PERFORM THE FOLLOWING ADL SINCE THE ONSET OF PRESENT ILLNESS?
Assessment/Plan:    1  Acute conjunctivitis of right eye, unspecified acute conjunctivitis type  -     polymyxin b-trimethoprim (POLYTRIM) ophthalmic solution; Administer 1 drop to the right eye every 6 (six) hours for 7 days    2  Upper respiratory tract infection, unspecified type         POLYTRIM FOR CONJUNCTIVITIS  WARM COMPRESSES PRN  REVIEWED SUPPORTIVE MEASURES FOR URI  CALL IF NO IMPROVEMENT  Subjective:      Patient ID: Wiley Irby is a 2 y o  female  HPI    HERE TODAY WITH MOM FOR COUGH, RUNNY NOSE, AND EYE D/C   FAMILY MOVED BACK TO THE AREA AND CHILD STARTED  LOCALLY  SINCE THEN, SEEMS TO BE GETTING FREQUENT URIs  THIS PARTICULAR EPISODE OF COUGH, CONGESTION, HAS BEEN ONGOING FOR A FEW DAYS; THEN MOM NOTICED RIGHT EYE D/C YESTERDAY  NO KNOWN FOREIGN BODY  ANOTHER CLASSMATE HAS PINK EYE, PER MOM  NO OBVIOUS VISUAL DISTURBANCE BUT EYE SEEMS A LITTLE PRURITIC  NO FEVER  The following portions of the patient's history were reviewed and updated as appropriate: allergies, current medications, past family history, past medical history, past social history, past surgical history and problem list     Review of Systems   Constitutional: Negative for fever  HENT: Positive for congestion and rhinorrhea  Negative for ear discharge and ear pain  Eyes: Positive for discharge, redness and itching  Negative for pain  Respiratory: Positive for cough  Negative for wheezing and stridor  Gastrointestinal: Negative for diarrhea and vomiting  Genitourinary: Negative for decreased urine volume  Skin: Negative for rash  Objective:      Temp 98 2 °F (36 8 °C) (Tympanic)   Ht 2' 11 04" (0 89 m)   Wt 11 9 kg (26 lb 2 oz)   BMI 14 96 kg/m²        Physical Exam  Constitutional:       General: She is active  She is not in acute distress  Appearance: Normal appearance  She is well-developed  She is not toxic-appearing  HENT:      Head: Normocephalic  Right Ear: Tympanic membrane, ear canal and external ear normal       Left Ear: Tympanic membrane, ear canal and external ear normal       Nose: Congestion and rhinorrhea (YELLOW) present  Mouth/Throat:      Mouth: Mucous membranes are moist       Pharynx: No oropharyngeal exudate or posterior oropharyngeal erythema  Eyes:      General: Red reflex is present bilaterally  Visual tracking is normal  Lids are normal  Gaze aligned appropriately  No allergic shiner  Right eye: Discharge (YELLOW CRUSTY) present  No stye  Left eye: No discharge or stye  No periorbital edema on the right side  No periorbital edema on the left side  Extraocular Movements: Extraocular movements intact  Right eye: Normal extraocular motion  Left eye: Normal extraocular motion  Pupils: Pupils are equal, round, and reactive to light  Comments: SLIGHTLY INFLAMED CONJUNCTIVA ON RIGHT     Cardiovascular:      Rate and Rhythm: Normal rate and regular rhythm  Pulses: Normal pulses  Heart sounds: Normal heart sounds  No murmur heard  No friction rub  No gallop  Pulmonary:      Effort: Pulmonary effort is normal       Breath sounds: Normal breath sounds  No stridor  No wheezing, rhonchi or rales  Musculoskeletal:      Cervical back: Normal range of motion and neck supple  Lymphadenopathy:      Cervical: No cervical adenopathy  Neurological:      Mental Status: She is alert             Procedures no

## 2022-10-20 ENCOUNTER — APPOINTMENT (OUTPATIENT)
Dept: PLASTIC SURGERY | Facility: CLINIC | Age: 67
End: 2022-10-20

## 2022-10-26 ENCOUNTER — APPOINTMENT (OUTPATIENT)
Dept: PLASTIC SURGERY | Facility: CLINIC | Age: 67
End: 2022-10-26

## 2022-10-26 VITALS
BODY MASS INDEX: 37.21 KG/M2 | HEART RATE: 91 BPM | OXYGEN SATURATION: 98 % | DIASTOLIC BLOOD PRESSURE: 76 MMHG | WEIGHT: 210 LBS | TEMPERATURE: 97.7 F | SYSTOLIC BLOOD PRESSURE: 144 MMHG | HEIGHT: 63 IN

## 2022-10-26 PROCEDURE — 99212 OFFICE O/P EST SF 10 MIN: CPT | Mod: NC

## 2022-12-21 PROBLEM — E88.1 LIPODYSTROPHY: Status: ACTIVE | Noted: 2019-09-09

## 2022-12-21 PROBLEM — Z09 POSTOP CHECK: Status: ACTIVE | Noted: 2021-05-06

## 2023-01-04 NOTE — ED ADULT NURSE NOTE - OBJECTIVE STATEMENT
The potential future options for medication management we discussed are:  -Increasing your lamotrigine dose  -Keeping lamotrigine the same and adding an SSRI  -Switching from lamotrigine to lithium, with regular kidney monitoring  -Switching from lamotrigine to lurasidone    Any of the above would be reasonable options.    I am not making any changes today. We will see what you decide after seeing Mr. Melara again.    Feel free to take your gabapentin as needed.     Your cholesterol is a bit high. Eat some almonds or something.   Pt received to RM 14: A&Ox4, PMH of GERD, obesity, HLD, presents to ED c/o worsening dyspnea on exertion x 4 days, s/p lipodystrophy on 4/11 with Dr. Garcia. Pt saw plastic surgeon today and was told to come in to ED to r/o PE, denies calf pain. Pt has FREDDIE drain to L hip with 10 mL of serosanguinous drainage. Respirations are even and unlabored, sating at 96% on RA, NSR on cardiac monitor, 20 G to L AC placed, labs sent. Awaiting further orders.

## 2023-12-07 NOTE — ASU PREOP CHECKLIST - LATEX ALLERGY
Intubation    Date/Time: 12/6/2023 7:42 PM    Performed by: King Man CRNA  Authorized by: Cristi Caledrón MD    Intubation:     Induction:  Intravenous    Intubated:  Postinduction    Mask Ventilation:  Not attempted    Attempts:  1    Attempted By:  CRNA    Method of Intubation:  Video laryngoscopy    Blade:  Matute 3    Laryngeal View Grade: Grade I - full view of cords      Difficult Airway Encountered?: No      Complications:  None    Airway Device:  Oral endotracheal tube    Airway Device Size:  7.0    Style/Cuff Inflation:  Cuffed (inflated to minimal occlusive pressure)    Tube secured:  20    Secured at:  The lips    Placement Verified By:  Capnometry    Complicating Factors:  None    Findings Post-Intubation:  BS equal bilateral and atraumatic/condition of teeth unchanged      
no

## 2024-07-10 NOTE — H&P ADULT - PSYCHIATRIC
Problem: Discharge Planning  Goal: Discharge to home or other facility with appropriate resources  7/10/2024 0031 by Kelin Ayala RN  Outcome: Progressing  7/9/2024 1829 by Debbie Gillespie RN  Outcome: Progressing     Problem: ABCDS Injury Assessment  Goal: Absence of physical injury  7/10/2024 0031 by Kelin Ayala RN  Outcome: Progressing  7/9/2024 1829 by Debbie Gillespie RN  Outcome: Progressing     Problem: Pain  Goal: Verbalizes/displays adequate comfort level or baseline comfort level  7/10/2024 0031 by Kelin Ayala RN  Outcome: Progressing  7/9/2024 1829 by Debbie Gillespie RN  Outcome: Progressing     Problem: Safety - Adult  Goal: Free from fall injury  7/10/2024 0031 by Kelin Ayala RN  Outcome: Progressing  7/9/2024 1829 by Debbie Gillespie RN  Outcome: Progressing     Problem: Nutrition Deficit:  Goal: Optimize nutritional status  7/10/2024 0031 by Kelin Ayala RN  Outcome: Progressing  7/9/2024 1829 by Debbie Gillespie RN  Outcome: Progressing     Problem: Skin/Tissue Integrity  Goal: Absence of new skin breakdown  Description: 1.  Monitor for areas of redness and/or skin breakdown  2.  Assess vascular access sites hourly  3.  Every 4-6 hours minimum:  Change oxygen saturation probe site  4.  Every 4-6 hours:  If on nasal continuous positive airway pressure, respiratory therapy assess nares and determine need for appliance change or resting period.  7/10/2024 0031 by Kelin Ayala RN  Outcome: Progressing  7/9/2024 1829 by Debbie Gillespie RN  Outcome: Progressing      details… detailed exam

## 2025-01-02 NOTE — ASU PREOP CHECKLIST - HEIGHT IN INCHES
1 Most recent echo with EF of 65% grade 1 diastolic dysfunction  Hypervolemic with third spacing  Volume status improving for CT with IV contrast today hold off on diuretics for today unless worsening volume status later

## 2025-03-03 NOTE — H&P PST ADULT - NSICDXFAMILYHX_GEN_ALL_CORE_FT
not tested
FAMILY HISTORY:  Father  Still living? No  FH: esophageal cancer, Age at diagnosis: Age Unknown    Sibling  Still living? No  FH: brain aneurysm, Age at diagnosis: 51-60  FH: colon cancer, Age at diagnosis: 41-50

## (undated) DEVICE — SPONGE RAYTEC 4X4 16PLY

## (undated) DEVICE — DRSG ABDOMINAL BINDER MED/LG 12" X 36"-54"

## (undated) DEVICE — SYR LUER LOK 10CC

## (undated) DEVICE — GLV 7 PROTEXIS

## (undated) DEVICE — SUT DERMABOND PRINEO 60CM

## (undated) DEVICE — PACK MINOR

## (undated) DEVICE — DRAPE SPLIT SHEET 77X108"

## (undated) DEVICE — DRAIN BLAKE 15FR ROUND

## (undated) DEVICE — Device

## (undated) DEVICE — DRAPE 3/4 SHEET 52X76"

## (undated) DEVICE — TUBING INFILTRATION

## (undated) DEVICE — CANISTER DISPOSABLE THIN WALL 3000CC

## (undated) DEVICE — TUBING LIPOSUCTION

## (undated) DEVICE — DRAIN RESERVOIR FOR JACKSON PRATT 100CC CARDINAL

## (undated) DEVICE — GLV 8 PROTEXIS

## (undated) DEVICE — TUBING ASPIRATION SET MICROAIRE 12FT

## (undated) DEVICE — WRAP COMPRESSION CALF MED

## (undated) DEVICE — GLV 6.5 PROTEXIS

## (undated) DEVICE — GOWN XL

## (undated) DEVICE — BLANKET WARMER FULL UNDERBODY

## (undated) DEVICE — LABEL MED BLANK W PEN

## (undated) DEVICE — DRSG STERISTRIPS 0.5X4"

## (undated) DEVICE — TUBING SINGLE SPIKE INFILTRTION 15.5 FT

## (undated) DEVICE — SOL IRR POUR NS 0.9% 500ML